# Patient Record
Sex: FEMALE | Race: WHITE | NOT HISPANIC OR LATINO | Employment: FULL TIME | URBAN - METROPOLITAN AREA
[De-identification: names, ages, dates, MRNs, and addresses within clinical notes are randomized per-mention and may not be internally consistent; named-entity substitution may affect disease eponyms.]

---

## 2023-12-11 ENCOUNTER — OFFICE VISIT (OUTPATIENT)
Dept: ORTHOPEDIC SURGERY | Facility: CLINIC | Age: 63
End: 2023-12-11
Payer: COMMERCIAL

## 2023-12-11 VITALS — WEIGHT: 216 LBS | HEIGHT: 70 IN | BODY MASS INDEX: 30.92 KG/M2

## 2023-12-11 DIAGNOSIS — M17.11 ARTHRITIS OF RIGHT KNEE: ICD-10-CM

## 2023-12-11 DIAGNOSIS — G89.29 CHRONIC PAIN OF RIGHT KNEE: ICD-10-CM

## 2023-12-11 DIAGNOSIS — M25.561 CHRONIC PAIN OF RIGHT KNEE: ICD-10-CM

## 2023-12-11 DIAGNOSIS — M16.11 ARTHRITIS OF RIGHT HIP: Primary | ICD-10-CM

## 2023-12-11 PROCEDURE — 1036F TOBACCO NON-USER: CPT | Performed by: ORTHOPAEDIC SURGERY

## 2023-12-11 PROCEDURE — 20610 DRAIN/INJ JOINT/BURSA W/O US: CPT | Performed by: ORTHOPAEDIC SURGERY

## 2023-12-11 PROCEDURE — 99203 OFFICE O/P NEW LOW 30 MIN: CPT | Performed by: ORTHOPAEDIC SURGERY

## 2023-12-11 RX ORDER — LIDOCAINE HYDROCHLORIDE 10 MG/ML
2 INJECTION INFILTRATION; PERINEURAL
Status: COMPLETED | OUTPATIENT
Start: 2023-12-11 | End: 2023-12-11

## 2023-12-11 RX ORDER — TRIAMCINOLONE ACETONIDE 40 MG/ML
40 INJECTION, SUSPENSION INTRA-ARTICULAR; INTRAMUSCULAR
Status: COMPLETED | OUTPATIENT
Start: 2023-12-11 | End: 2023-12-11

## 2023-12-11 RX ADMIN — TRIAMCINOLONE ACETONIDE 40 MG: 40 INJECTION, SUSPENSION INTRA-ARTICULAR; INTRAMUSCULAR at 14:56

## 2023-12-11 RX ADMIN — LIDOCAINE HYDROCHLORIDE 2 ML: 10 INJECTION INFILTRATION; PERINEURAL at 14:56

## 2023-12-11 ASSESSMENT — ENCOUNTER SYMPTOMS: KNEE DEFORMITY: 1

## 2023-12-11 NOTE — PROGRESS NOTES
Subjective    Patient ID: Princess Thompson is a 63 y.o. female.    Chief Complaint: Pain of the Left Knee       This is a 63-year-old female referred by her PCP for evaluation of right leg pain.  The right leg pain is of varying degrees and includes the right hip and buttocks, right thigh as well as the right knee.  With regard to the hip pain she notes difficulty rising out of a chair as well as getting in and out of an automobile.  With regard to the right knee she has noted some crepitus with going up and down steps as well as stiffness after prolonged sitting.  She feels her right hip pain is worse than her right knee pain.  She does have some degree of back pain as well.  She has been told by her PCP Dr. Hernandez that she has advanced arthritis and impingement of her right hip as well as bulging disks at the L3 and 4 level and mild degenerative changes involving the right knee.  Unfortunately she arrives today with no recent x-rays of her back, hip nor knee.  She does arrive though with an MRI scan of her right knee which are reviewed at great length myself demonstrate a small joint effusion with some degenerative changes throughout the knee including mild degenerative chondromalacia of the medial compartment as well as patellofemoral joint space.  She states her hip x-rays were likely done within the last 1 to 2 months but once again these were not available for us today.    This patient's past medical, social, and family history were reviewed as well as a review of systems including updates on the patient's information encounter sheet  Patient denies a history diabetes  Patient remains on the Synthroid medication    Physical Examination  Constitutional: Patient's height and weight reviewed, appears well kempt  Psychiatric: Alert and oriented ×3, appropriate mood and behavior  Pulmonary: Breathing appears nonlabored, no apparent distress  Lymphatic: No appreciable lymphadenopathy to both the upper and lower  extremities  Skin: No open lesions, rashes, ulcerations  Neurologic: Gross motor and sensory exam appear intact (except for abnormalities noted in the below muscle skeletal exam)    Musculoskeletal: There is some mild paraspinal muscle tenderness but satisfactory extension and flexion of the lumbar spine.  The right hip though has limited flexion to 95 degrees.  Attempted internal rotation more than 5 degrees recreate groin and thigh pain.  External rotation is limited to 20 degrees.  The right knee reveals full extension as well as flexion past 125 degrees but there is moderate patellofemoral crepitus with range of motion and a moderately positive patellar apprehension sign    Assessment: Right lower extremity pain likely of multiple etiologies with the most concerning being likely right hip arthritis based on examination but unfortunate no x-rays were available for us.  Patient also likely has some degree of right knee arthritis based on exam and review of her MRI    Plan: With regard to the right knee arthritis we elected to proceed with a right knee intra-articular Kenalog injection today.  Patient will observe to see if this is of benefit.  I have suggested she obtain her hip x-rays and refer her for evaluation to Dr. Hardin with those x-rays available for him to review.  If he deems that her hip arthritis is significant he may consider the options for right hip intra-articular injection of Kenalog for both therapeutic and diagnostic purposes.    Left Knee     L Inj/Asp: R knee on 12/11/2023 2:56 PM  Indications: pain  Details: 22 G needle, anterolateral approach  Medications: 40 mg triamcinolone acetonide 40 mg/mL; 2 mL lidocaine 10 mg/mL (1 %)  Consent was given by the patient. Patient was prepped and draped in the usual sterile fashion.             No current outpatient medications on file.

## 2024-01-03 ENCOUNTER — OFFICE VISIT (OUTPATIENT)
Dept: ORTHOPEDIC SURGERY | Facility: CLINIC | Age: 64
End: 2024-01-03
Payer: COMMERCIAL

## 2024-01-03 VITALS — HEIGHT: 71 IN | BODY MASS INDEX: 29.4 KG/M2 | WEIGHT: 210 LBS

## 2024-01-03 DIAGNOSIS — M25.551 RIGHT HIP PAIN: ICD-10-CM

## 2024-01-03 DIAGNOSIS — M16.11 ARTHRITIS OF RIGHT HIP: Primary | ICD-10-CM

## 2024-01-03 PROCEDURE — 1036F TOBACCO NON-USER: CPT | Performed by: FAMILY MEDICINE

## 2024-01-03 PROCEDURE — 20611 DRAIN/INJ JOINT/BURSA W/US: CPT | Performed by: FAMILY MEDICINE

## 2024-01-03 PROCEDURE — 99213 OFFICE O/P EST LOW 20 MIN: CPT | Performed by: FAMILY MEDICINE

## 2024-01-03 RX ORDER — LIDOCAINE HYDROCHLORIDE 20 MG/ML
2 INJECTION, SOLUTION INFILTRATION; PERINEURAL
Status: COMPLETED | OUTPATIENT
Start: 2024-01-03 | End: 2024-01-03

## 2024-01-03 RX ORDER — TRIAMCINOLONE ACETONIDE 40 MG/ML
40 INJECTION, SUSPENSION INTRA-ARTICULAR; INTRAMUSCULAR
Status: COMPLETED | OUTPATIENT
Start: 2024-01-03 | End: 2024-01-03

## 2024-01-03 RX ADMIN — TRIAMCINOLONE ACETONIDE 40 MG: 40 INJECTION, SUSPENSION INTRA-ARTICULAR; INTRAMUSCULAR at 14:30

## 2024-01-03 RX ADMIN — LIDOCAINE HYDROCHLORIDE 2 ML: 20 INJECTION, SOLUTION INFILTRATION; PERINEURAL at 14:30

## 2024-01-03 NOTE — PROGRESS NOTES
History of Present Illness   Chief Complaint   Patient presents with    Right Hip - Follow-up       The patient is 63 y.o. female  here with a complaint of right hip and thigh pain with radiation past the knee into the lower leg.  Patient has been referred by Dr. LoPresti who saw patient a few weeks ago for this as well as some knee pain.  She was treated with a knee joint injection by Dr. LoPresti which did provide good improvement in her knee pain but still having pain in the anterior hip, thigh and lower leg.  She does bring x-rays of her hip in today for review as well as her lumbar spine.,  She has pain with attempts at going from sitting to standing, getting in and out of her car.  She has been taking meloxicam for her pain with minimal change in symptoms.    No past medical history on file.    Medication Documentation Review Audit       Reviewed by Michael A Lopresti, MD (Physician) on 12/11/23 at 1452      Medication Order Taking? Sig Documenting Provider Last Dose Status            No Medications to Display                                   No Known Allergies    Social History     Socioeconomic History    Marital status:      Spouse name: Not on file    Number of children: Not on file    Years of education: Not on file    Highest education level: Not on file   Occupational History    Not on file   Tobacco Use    Smoking status: Former     Types: Cigarettes    Smokeless tobacco: Never   Substance and Sexual Activity    Alcohol use: Yes     Comment: MODERATELY    Drug use: Never    Sexual activity: Not on file   Other Topics Concern    Not on file   Social History Narrative    Not on file     Social Determinants of Health     Financial Resource Strain: Not on file   Food Insecurity: Not on file   Transportation Needs: Not on file   Physical Activity: Not on file   Stress: Not on file   Social Connections: Not on file   Intimate Partner Violence: Not on file   Housing Stability: Not on file       No  past surgical history on file.       Review of Systems   GENERAL: Negative  GI: Negative  MUSCULOSKELETAL: See HPI  SKIN: Negative  NEURO:  Negative     Physical Exam:    General/Constitutional: well appearing, no distress, appears stated age  HEENT: sclera clear  Respiratory: non labored breathing  Vascular: No edema, swelling or tenderness, except as noted in detailed exam.  Integumentary: No impressive skin lesions present, except as noted in detailed exam.  Neurological:  Alert and oriented   Psychological:  Normal mood and affect.  Musculoskeletal: Normal, except as noted in detailed exam and in HPI.  Antalgic gait, unassisted    Right hip: Normal appearance, no rotational deformity or leg length discrepancy.  No areas of significant tenderness to palpation.  She has some knee range of motion, flexion to 95 degrees, internal rotation 10 degrees with exacerbation of groin and thigh pain.  No significant motor deficits are present.  She does have a positive Stinchfield test.       Imaging: X-rays of right hip and lumbar spine available for review, she has advanced degenerative changes of the right hip, there is joint space narrowing, there is subchondral sclerosis and osteophytosis.  X-rays of lumbar spine show some mild degenerative changes with mild disc height loss, anterior osteophytosis, there is some facet arthropathy    L Inj/Asp: R hip joint on 1/3/2024 2:30 PM  Indications: pain  Details: 22 G needle, ultrasound-guided anterior approach  Medications: 40 mg triamcinolone acetonide 40 mg/mL; 2 mL lidocaine 20 mg/mL (2 %)  Outcome: tolerated well, no immediate complications  Procedure, treatment alternatives, risks and benefits explained, specific risks discussed. Consent was given by the patient. Immediately prior to procedure a time out was called to verify the correct patient, procedure, equipment, support staff and site/side marked as required. Patient was prepped and draped in the usual sterile  fashion.               Assessment   1. Arthritis of right hip        2. Right hip pain  Point of Care Ultrasound        Right hip/lower extremity pain, symptoms thought to be secondary to advanced osteoarthritis of the right hip which was confirmed on x-rays today, we discussed potential for possible radicular component to her symptoms that are extending past her knee    Plan: Discussed diagnosis, further workup and treatment.  We did proceed with ultrasound-guided right hip joint injection with Kenalog today for both diagnostic and hopefully therapeutic purposes.  She will monitor for improvement in symptoms over the next few days, if she has ongoing pain in the lower leg could consider medical spine evaluation for lumbar radiculopathy.  Patient voiced understanding.  She will follow-up as symptoms dictate.

## 2024-04-03 ENCOUNTER — OFFICE VISIT (OUTPATIENT)
Dept: ORTHOPEDIC SURGERY | Facility: CLINIC | Age: 64
End: 2024-04-03
Payer: COMMERCIAL

## 2024-04-03 DIAGNOSIS — M16.11 PRIMARY OSTEOARTHRITIS OF RIGHT HIP: ICD-10-CM

## 2024-04-03 DIAGNOSIS — S76.311A HAMSTRING STRAIN, RIGHT, INITIAL ENCOUNTER: Primary | ICD-10-CM

## 2024-04-03 PROCEDURE — 1036F TOBACCO NON-USER: CPT | Performed by: FAMILY MEDICINE

## 2024-04-03 PROCEDURE — 99213 OFFICE O/P EST LOW 20 MIN: CPT | Performed by: FAMILY MEDICINE

## 2024-04-03 NOTE — PROGRESS NOTES
History of Present Illness   Chief Complaint   Patient presents with    Right Hip - Follow-up       The patient is 63 y.o. female  here for follow-up of right hip pain.  Patient was initially seen by me 3 months ago, at that time she was treated with hip joint injection for underlying advanced osteoarthritis.  She was complaining of some pain past the knee at that time, wondering if there was potential radicular component to her symptoms but says that after her hip joint injection she had resolution of pain and was doing really well, relatively asymptomatic.  Presented last week sometime she started having some new discomfort in the more posterior and lateral aspect of her hip.  She says that if she bends down to tie her shoe or reach really on the ground as she stands up she will get a pain in the posterior lateral aspect of her hip, will resolve after she is up standing and says she does fine after this.  She denies any significant pain with walking or sitting.  She denies any return of any anterior hip or groin pain.        No past medical history on file.    Medication Documentation Review Audit       Reviewed by Roberto Hardin MD (Physician) on 01/03/24 at 1431      Medication Order Taking? Sig Documenting Provider Last Dose Status            No Medications to Display                                   No Known Allergies    Social History     Socioeconomic History    Marital status:      Spouse name: Not on file    Number of children: Not on file    Years of education: Not on file    Highest education level: Not on file   Occupational History    Not on file   Tobacco Use    Smoking status: Former     Types: Cigarettes    Smokeless tobacco: Never   Substance and Sexual Activity    Alcohol use: Yes     Comment: MODERATELY    Drug use: Never    Sexual activity: Not on file   Other Topics Concern    Not on file   Social History Narrative    Not on file     Social Determinants of Health     Financial Resource  Strain: Not on file   Food Insecurity: Not on file   Transportation Needs: Not on file   Physical Activity: Not on file   Stress: Not on file   Social Connections: Not on file   Intimate Partner Violence: Not on file   Housing Stability: Not on file       No past surgical history on file.       Review of Systems   GENERAL: Negative  GI: Negative  MUSCULOSKELETAL: See HPI  SKIN: Negative  NEURO:  Negative     Physical Exam:    General/Constitutional: well appearing, no distress, appears stated age  HEENT: sclera clear  Respiratory: non labored breathing  Vascular: No edema, swelling or tenderness, except as noted in detailed exam.  Integumentary: No impressive skin lesions present, except as noted in detailed exam.  Neurological:  Alert and oriented   Psychological:  Normal mood and affect.  Musculoskeletal: Normal, except as noted in detailed exam and in HPI.  Normal gait, unassisted    Right hip: Normal appearance, no rotational deformity or leg length discrepancy.  There is some tenderness palpation at the greater trochanter on the right however this is equal to tenderness that she has at the left greater trochanter as well.  There is some mild tenderness near the proximal hamstring tendon origin posteriorly.  Limited range of motion at the hip flexion to 95 degrees, internal rotation 10 degrees, no groin pain today with hip range of motion actively or passively.  No significant motor deficits are present.  Negative Stinchfield test.  There is some exacerbation of posterior lateral hip pain with passive hip flexion with knee extended as well as some resisted hip extension.         Assessment   1. Hamstring strain, right, initial encounter        2. Primary osteoarthritis of right hip          Patient had good response to hip joint injection 3 months ago with resolution of hip joint pain, and has developed some new onset posterior hip pain over the past week, symptoms suggestive of proximal hamstring strain, does  feel like it is improving over the past 24 to 48 hours    Plan: Discussed diagnosis, further workup and treatment options with patient.  Recommending conservative management.  I did give her some home exercises, stretches that she can begin for the proximal hamstring.  She was also given a general hip conditioning program for her hip osteoarthritis.  She does have some tenderness at the greater trochanter on the right but is also similar to pain that she has on the left and does not describe any focal pain in this area, we opted to hold off on any lateral hip injection today.  She will follow-up if symptoms are ongoing despite conservative management.

## 2024-08-16 ENCOUNTER — HOSPITAL ENCOUNTER (OUTPATIENT)
Dept: RADIOLOGY | Facility: CLINIC | Age: 64
Discharge: HOME | End: 2024-08-16
Payer: COMMERCIAL

## 2024-08-16 ENCOUNTER — OFFICE VISIT (OUTPATIENT)
Dept: ORTHOPEDIC SURGERY | Facility: CLINIC | Age: 64
End: 2024-08-16
Payer: COMMERCIAL

## 2024-08-16 VITALS — WEIGHT: 210 LBS | BODY MASS INDEX: 29.4 KG/M2 | HEIGHT: 71 IN

## 2024-08-16 DIAGNOSIS — M25.562 ACUTE PAIN OF LEFT KNEE: ICD-10-CM

## 2024-08-16 DIAGNOSIS — M17.12 ARTHRITIS OF LEFT KNEE: Primary | ICD-10-CM

## 2024-08-16 DIAGNOSIS — G89.29 CHRONIC PAIN OF LEFT KNEE: ICD-10-CM

## 2024-08-16 DIAGNOSIS — M25.562 CHRONIC PAIN OF LEFT KNEE: ICD-10-CM

## 2024-08-16 PROCEDURE — 99214 OFFICE O/P EST MOD 30 MIN: CPT

## 2024-08-16 PROCEDURE — 20610 DRAIN/INJ JOINT/BURSA W/O US: CPT | Mod: LT

## 2024-08-16 PROCEDURE — 2500000005 HC RX 250 GENERAL PHARMACY W/O HCPCS

## 2024-08-16 PROCEDURE — 2500000004 HC RX 250 GENERAL PHARMACY W/ HCPCS (ALT 636 FOR OP/ED)

## 2024-08-16 PROCEDURE — 73562 X-RAY EXAM OF KNEE 3: CPT | Mod: LT

## 2024-08-16 RX ORDER — LIDOCAINE HYDROCHLORIDE 20 MG/ML
2 INJECTION, SOLUTION INFILTRATION; PERINEURAL
Status: COMPLETED | OUTPATIENT
Start: 2024-08-16 | End: 2024-08-16

## 2024-08-16 RX ORDER — TRIAMCINOLONE ACETONIDE 40 MG/ML
40 INJECTION, SUSPENSION INTRA-ARTICULAR; INTRAMUSCULAR
Status: COMPLETED | OUTPATIENT
Start: 2024-08-16 | End: 2024-08-16

## 2024-08-16 NOTE — PROGRESS NOTES
Subjective    Patient ID: Princess Thompson is a 64 y.o. female.    Chief Complaint: Pain of the Left Knee     HPI  This is a pleasant 64-year-old female presenting to the office for evaluation of left knee pain.  There is been no known injury.  Pain has been ongoing for approximately 4 days.  Patient did just return from a vacation to [place].  States she was taking walks on the beach and did have a 14-hour drive home.  Patient was very concerned when Sunday morning, she was having significant difficulty weightbearing on left lower extremity.  States that knee was very swollen, she noticed swelling to anterior and posterior aspect of her knee.  Pain is focal to anterior medial knee.  Pain and swelling have caused limited range of motion.  She is having mechanical symptoms of knee giving out and locking.  Pain and swelling hinder activities of daily living including prolonged standing walking and stair climbing.  The pain is awaking her at night.  She definitely has increased pain with any incline walking as well.  She has been taking meloxicam or ibuprofen as well as applying ice and elevating.  She has been using a compression type knee sleeve.  Denies redness or warmth to knee.    The patient's past medical, surgical, family, and social history as well as allergies and medications were reviewed and updated in the chart.    Objective   Ortho Exam  Pleasant and no acute distress. Walks with a mildly antalgic gait.   Left knee appearing without soft tissue swelling erythema or ecchymosis.  There is no warmth upon touch.  Patellofemoral crepitus noted with range of motion testing.  Left knee range of motion is 5-110°.  Cris's is positive with pain medially.  Focal medial joint line tenderness, mild tenderness to popliteal fossa.  The knee is stable to varus and valgus stress Lachman and posterior drawer. There is a mild effusion. Right knee range of motion is 0-120°. There is no effusion. The knee is  stable to varus and valgus stress Lachman and posterior drawer. Both lower extremities are well perfused the skin is intact and muscle tone is adequate.    Image Results:  Multiple view x-rays of the left knee obtained today personally reviewed without evidence of acute fracture or dislocation. There are degenerative changes of the left knee noted in medial and patellofemoral compartments with joint space narrowing and osteophyte formation.     Assessment/Plan   Encounter Diagnoses:  Arthritis of left knee    Chronic pain of left knee    Plan: Discussion with patient about left knee arthritis with review of today's x-rays.  Conservative treatment options were discussed at length.  Explained to patient that most likely she has sustained a left knee effusion, which lead to the back of her knee and caused a Baker's cyst.  There is concern though for possible medial meniscus tear, as she does have focal medial joint line tenderness and is having mechanical symptoms of knee giving out and buckling.  After the risks and benefits of the left knee intra-articular steroid injection of Kenalog/lidocaine were discussed, patient agreed to injection and tolerated well.  She can receive these injections every 3 months as needed.  Discussed the use of Voltaren gel applied to medial knee to help decrease pain and inflammation.  We discussed a home exercise program including walking on a treadmill with no incline, using a recumbent bike, and any type of water therapy.  She knows to avoid any high impact activities.  I have also provided patient an economy hinged brace to help with left knee stability.  She will avoid aggravating activities.  She can take Tylenol and apply ice.  If patient does not see significant relief of symptoms and 4 to 6 weeks, an MRI of the left knee would be indicated to assess for medial meniscus tear.    L Inj/Asp: L knee on 8/16/2024 7:52 PM  Indications: pain  Details: 22 G needle, superolateral  approach  Medications: 40 mg triamcinolone acetonide 40 mg/mL; 2 mL lidocaine 20 mg/mL (2 %)  Procedure, treatment alternatives, risks and benefits explained, specific risks discussed. Consent was given by the patient.            Orders Placed This Encounter    XR knee left 3 views

## 2024-10-02 ENCOUNTER — OFFICE VISIT (OUTPATIENT)
Dept: ORTHOPEDIC SURGERY | Facility: CLINIC | Age: 64
End: 2024-10-02
Payer: COMMERCIAL

## 2024-10-02 ENCOUNTER — HOSPITAL ENCOUNTER (OUTPATIENT)
Dept: RADIOLOGY | Facility: EXTERNAL LOCATION | Age: 64
Discharge: HOME | End: 2024-10-02

## 2024-10-02 DIAGNOSIS — M17.12 ARTHRITIS OF LEFT KNEE: ICD-10-CM

## 2024-10-02 DIAGNOSIS — M25.551 RIGHT HIP PAIN: ICD-10-CM

## 2024-10-02 DIAGNOSIS — M16.11 PRIMARY OSTEOARTHRITIS OF RIGHT HIP: Primary | ICD-10-CM

## 2024-10-02 PROCEDURE — 99214 OFFICE O/P EST MOD 30 MIN: CPT | Performed by: FAMILY MEDICINE

## 2024-10-02 PROCEDURE — 20611 DRAIN/INJ JOINT/BURSA W/US: CPT | Mod: RT | Performed by: FAMILY MEDICINE

## 2024-10-02 PROCEDURE — 1036F TOBACCO NON-USER: CPT | Performed by: FAMILY MEDICINE

## 2024-10-02 PROCEDURE — 2500000004 HC RX 250 GENERAL PHARMACY W/ HCPCS (ALT 636 FOR OP/ED): Performed by: FAMILY MEDICINE

## 2024-10-02 RX ORDER — LIDOCAINE HYDROCHLORIDE 20 MG/ML
2 INJECTION, SOLUTION INFILTRATION; PERINEURAL
Status: COMPLETED | OUTPATIENT
Start: 2024-10-02 | End: 2024-10-02

## 2024-10-02 RX ORDER — TRIAMCINOLONE ACETONIDE 40 MG/ML
40 INJECTION, SUSPENSION INTRA-ARTICULAR; INTRAMUSCULAR
Status: COMPLETED | OUTPATIENT
Start: 2024-10-02 | End: 2024-10-02

## 2024-10-02 NOTE — PROGRESS NOTES
History of Present Illness   Chief Complaint   Patient presents with    Right Hip - Follow-up       The patient is 64 y.o. female  here for follow-up of right hip pain/osteoarthritis.  Patient was seen by me in January 2024 for right hip pain/arthritis, x-ray showed some moderate degenerative changes, she was treated with a right hip joint injection which provided good relief, she has a recurrence of pain over the past few weeks, over the anterior groin exacerbated by walking, standing.  She denies any new injuries or symptoms.  She does admit to some referred pain down the leg to the knee, has had knee injection in the past, unsure if knee pain is referred pain from her hip or coming from her knee.    She does mention some recurrent left knee pain, she did see my colleague Betsy in August, x-ray showed some mild degenerative changes, she was treated with a knee joint ejection which provided good relief, she had recurrence of pain on Monday of this week walking on the stairs, has been resting, slight improvement with this but still ongoing pain    No past medical history on file.    Medication Documentation Review Audit       Reviewed by KARLOS Underwood (Nurse Practitioner) on 08/16/24 at 0810      Medication Order Taking? Sig Documenting Provider Last Dose Status            No Medications to Display                                   No Known Allergies    Social History     Socioeconomic History    Marital status:      Spouse name: Not on file    Number of children: Not on file    Years of education: Not on file    Highest education level: Not on file   Occupational History    Not on file   Tobacco Use    Smoking status: Former     Types: Cigarettes    Smokeless tobacco: Never   Substance and Sexual Activity    Alcohol use: Yes     Comment: MODERATELY    Drug use: Never    Sexual activity: Not on file   Other Topics Concern    Not on file   Social History Narrative    Not on file     Social  Determinants of Health     Financial Resource Strain: Not on file   Food Insecurity: Not on file   Transportation Needs: Not on file   Physical Activity: Not on file   Stress: Not on file   Social Connections: Not on file   Intimate Partner Violence: Not on file   Housing Stability: Not on file       No past surgical history on file.       Review of Systems   GENERAL: Negative  GI: Negative  MUSCULOSKELETAL: See HPI  SKIN: Negative  NEURO:  Negative     Physical Exam:    General/Constitutional: well appearing, no distress, appears stated age  HEENT: sclera clear  Respiratory: non labored breathing  Vascular: No edema, swelling or tenderness, except as noted in detailed exam.  Integumentary: No impressive skin lesions present, except as noted in detailed exam.  Neurological:  Alert and oriented   Psychological:  Normal mood and affect.  Musculoskeletal: Normal, except as noted in detailed exam and in HPI antalgic gait, unassisted    Right hip: Normal appearance, no rotational deformity or ligament discrepancy.  She does have some limited range of motion at the hip, flexion to 90 degrees, internal rotation 5 to 10 degrees with exacerbation of anterior hip and groin pain.  No motor deficits are present, positive Stinchfield test      Left knee: Normal appearance, no skin changes, there is a trace joint effusion.  There is generalized tenderness to palpation.  Range of motion from 3-110 degrees with pain, no motor deficits present, no gross ligamentous laxity, there is some discomfort with Bailey testing.     Imaging: No new imaging today    L Inj/Asp: R hip joint on 10/2/2024 10:47 AM  Indications: pain  Details: 22 G needle, ultrasound-guided anterior approach  Medications: 40 mg triamcinolone acetonide 40 mg/mL; 2 mL lidocaine 20 mg/mL (2 %)  Outcome: tolerated well, no immediate complications  Procedure, treatment alternatives, risks and benefits explained, specific risks discussed. Consent was given by the patient.  Immediately prior to procedure a time out was called to verify the correct patient, procedure, equipment, support staff and site/side marked as required. Patient was prepped and draped in the usual sterile fashion.               Assessment   1. Primary osteoarthritis of right hip        2. Right hip pain  Point of Care Ultrasound      3. Arthritis of left knee              Plan: Discussed diagnosis, further workup and treatment.  We did proceed with ultrasound-guided right hip joint injection under ultrasound guidance with Kenalog, see procedure note for full details.  Hopefully this will improve her hip pain as well as some pain into her knee which is likely referred from the hip, if ongoing knee pain after hip injection could follow back up to discuss right knee pain further.    With regards to left knee pain, recurrent pain over the past 2 days walking on the stairs, did have left knee injection with B[facility] little less than 2 months ago.  Recommended ibuprofen, activity modification, she was given some home exercises to begin.  She will follow-up if symptoms persist despite conservative treatment with the left knee.

## 2024-10-09 ENCOUNTER — HOSPITAL ENCOUNTER (OUTPATIENT)
Dept: RADIOLOGY | Facility: CLINIC | Age: 64
Discharge: HOME | End: 2024-10-09
Payer: COMMERCIAL

## 2024-10-09 ENCOUNTER — HOSPITAL ENCOUNTER (OUTPATIENT)
Dept: RADIOLOGY | Facility: EXTERNAL LOCATION | Age: 64
Discharge: HOME | End: 2024-10-09

## 2024-10-09 ENCOUNTER — OFFICE VISIT (OUTPATIENT)
Dept: ORTHOPEDIC SURGERY | Facility: CLINIC | Age: 64
End: 2024-10-09
Payer: COMMERCIAL

## 2024-10-09 DIAGNOSIS — M25.561 ACUTE PAIN OF RIGHT KNEE: ICD-10-CM

## 2024-10-09 DIAGNOSIS — M17.12 ARTHRITIS OF LEFT KNEE: ICD-10-CM

## 2024-10-09 DIAGNOSIS — M25.562 CHRONIC PAIN OF LEFT KNEE: ICD-10-CM

## 2024-10-09 DIAGNOSIS — M16.11 PRIMARY OSTEOARTHRITIS OF RIGHT HIP: ICD-10-CM

## 2024-10-09 DIAGNOSIS — M17.11 PRIMARY OSTEOARTHRITIS OF RIGHT KNEE: Primary | ICD-10-CM

## 2024-10-09 DIAGNOSIS — G89.29 CHRONIC PAIN OF LEFT KNEE: ICD-10-CM

## 2024-10-09 PROCEDURE — 2500000004 HC RX 250 GENERAL PHARMACY W/ HCPCS (ALT 636 FOR OP/ED): Performed by: FAMILY MEDICINE

## 2024-10-09 PROCEDURE — 73564 X-RAY EXAM KNEE 4 OR MORE: CPT | Mod: RT

## 2024-10-09 PROCEDURE — 73564 X-RAY EXAM KNEE 4 OR MORE: CPT | Mod: RIGHT SIDE | Performed by: RADIOLOGY

## 2024-10-09 PROCEDURE — 20611 DRAIN/INJ JOINT/BURSA W/US: CPT | Mod: RT | Performed by: FAMILY MEDICINE

## 2024-10-09 PROCEDURE — 1036F TOBACCO NON-USER: CPT | Performed by: FAMILY MEDICINE

## 2024-10-09 PROCEDURE — 99214 OFFICE O/P EST MOD 30 MIN: CPT | Mod: 25 | Performed by: FAMILY MEDICINE

## 2024-10-09 PROCEDURE — 99214 OFFICE O/P EST MOD 30 MIN: CPT | Performed by: FAMILY MEDICINE

## 2024-10-09 RX ORDER — LIDOCAINE HYDROCHLORIDE 20 MG/ML
2 INJECTION, SOLUTION INFILTRATION; PERINEURAL
Status: COMPLETED | OUTPATIENT
Start: 2024-10-09 | End: 2024-10-09

## 2024-10-09 RX ORDER — TRIAMCINOLONE ACETONIDE 40 MG/ML
40 INJECTION, SUSPENSION INTRA-ARTICULAR; INTRAMUSCULAR
Status: COMPLETED | OUTPATIENT
Start: 2024-10-09 | End: 2024-10-09

## 2024-10-09 NOTE — PROGRESS NOTES
History of Present Illness   Chief Complaint   Patient presents with    OTHER     F/U LT KNEE       The patient is 64 y.o. female  here for follow-up of right hip, left knee as well as right knee.  Patient was seen by her last week mostly for follow-up of right hip osteoarthritis that responded well to injection in 2024, we did proceed with repeat hip joint injection last week which led to good improvement in her right hip symptoms, at that time she was complaining of some pain into her right knee, there was some concern that this was potentially referred pain from her hip, recommend that she follow back up if she had ongoing knee pain despite hip injection.  At her last visit she also mentions some recurrent left knee pain.  She had seen Betsy in August of this year for left knee pain, swelling, she was treated with a left knee injection which provided good relief with recent recurrence of pain and instability, x-rays of her left knee in August showed patellofemoral predominant degenerative changes moderate in severity, mild medial and lateral pain, there is some concern for potential meniscus pathology given mechanical symptoms.  Recommended follow-up with pain was ongoing.  She says that she continues to have mostly left knee pain and feelings of instability, says she is fearful to bear full weight through her left knee at times.  She feels like her right knee is painful secondary to compensation, pain is more mild on the right but still bothering her specifically as she is relying on her right knee more now with recurrence of her left knee pain and instability.      History reviewed. No pertinent past medical history.    Medication Documentation Review Audit       Reviewed by Roberto Hardin MD (Physician) on 10/09/24 at 1029      Medication Order Taking? Sig Documenting Provider Last Dose Status            No Medications to Display                                   No Known Allergies    Social History      Socioeconomic History    Marital status:      Spouse name: Not on file    Number of children: Not on file    Years of education: Not on file    Highest education level: Not on file   Occupational History    Not on file   Tobacco Use    Smoking status: Former     Types: Cigarettes    Smokeless tobacco: Never   Substance and Sexual Activity    Alcohol use: Yes     Comment: MODERATELY    Drug use: Never    Sexual activity: Not on file   Other Topics Concern    Not on file   Social History Narrative    Not on file     Social Determinants of Health     Financial Resource Strain: Not on file   Food Insecurity: Not on file   Transportation Needs: Not on file   Physical Activity: Not on file   Stress: Not on file   Social Connections: Not on file   Intimate Partner Violence: Not on file   Housing Stability: Not on file       History reviewed. No pertinent surgical history.       Review of Systems   GENERAL: Negative  GI: Negative  MUSCULOSKELETAL: See HPI  SKIN: Negative  NEURO:  Negative     Physical Exam:    General/Constitutional: well appearing, no distress, appears stated age  HEENT: sclera clear  Respiratory: non labored breathing  Vascular: No edema, swelling or tenderness, except as noted in detailed exam.  Integumentary: No impressive skin lesions present, except as noted in detailed exam.  Neurological:  Alert and oriented   Psychological:  Normal mood and affect.  Musculoskeletal: Normal, except as noted in detailed exam and in HPI antalgic gait, unassisted    Left knee: Normal appearance, no skin changes, no joint effusion.  There is generalized tenderness to palpation including distal quad, medial and lateral joint line.  Range of motion from 3-110 degrees with pain, no motor deficits present, no gross ligamentous laxity, positive Cris testing.    Right knee normal appearance, no swelling, no redness or warmth.  There is medial and lateral joint line tenderness, range of motion from 3 to 120  degrees with some discomfort endrange of flexion.  No gross motor deficits present at the knee.  There is some discomfort with Bailey testing.  Negative Lachman, negative posterior drawer.  Stable to varus and valgus stress     Imaging: X-rays of right knee obtained today and independently reviewed, there is some mild degenerative changes presents, medial lateral joint spaces are relatively well-preserved, there is some osteophytosis, there is some narrowing of the patellofemoral compartment, more mild compared to the left    L Inj/Asp: R knee on 10/9/2024 10:27 AM  Indications: pain  Details: 22 G needle, ultrasound-guided superolateral approach  Medications: 40 mg triamcinolone acetonide 40 mg/mL; 2 mL lidocaine 20 mg/mL (2 %)  Outcome: tolerated well, no immediate complications  Procedure, treatment alternatives, risks and benefits explained, specific risks discussed. Consent was given by the patient. Immediately prior to procedure a time out was called to verify the correct patient, procedure, equipment, support staff and site/side marked as required. Patient was prepped and draped in the usual sterile fashion.               Assessment   1. Primary osteoarthritis of right knee  Referral to Physical Therapy      2. Primary osteoarthritis of right hip        3. Acute pain of right knee  XR knee right 4+ views    Point of Care Ultrasound      4. Arthritis of left knee  Referral to Physical Therapy      5. Chronic pain of left knee  MR knee left wo IV contrast            Plan:     Right knee pain: Symptoms thought to be aggravation of mild underlying osteoarthritis, we did proceed with knee joint duction with Kenalog today, hopefully this will provide better relief compared to her left knee injection.  Referral to physical therapy was also placed  Right hip osteoarthritis, good response to recent hip joint injection, she will follow-up as needed for this, discussed potential need for replacement surgery in the  future  Left knee pain: Previous x-ray showed moderate patellofemoral predominant osteoarthritis, she is having recurrence of pain after injection 6 weeks ago, she is having ongoing instability/buckling of the knee, there is some concern for potential meniscus pathology contributing to her mechanical symptoms.  I did place referral to physical therapy to work on some knee strengthening and stability however I would like to obtain an MRI to evaluate for other derangement of the knee that may require surgical intervention/knee arthroscopy given instability of the knee.

## 2024-10-22 ENCOUNTER — EVALUATION (OUTPATIENT)
Dept: PHYSICAL THERAPY | Facility: CLINIC | Age: 64
End: 2024-10-22
Payer: COMMERCIAL

## 2024-10-22 DIAGNOSIS — M17.12 ARTHRITIS OF LEFT KNEE: ICD-10-CM

## 2024-10-22 DIAGNOSIS — M17.11 PRIMARY OSTEOARTHRITIS OF RIGHT KNEE: ICD-10-CM

## 2024-10-22 PROCEDURE — 97161 PT EVAL LOW COMPLEX 20 MIN: CPT | Mod: GP | Performed by: INTERNAL MEDICINE

## 2024-10-22 PROCEDURE — 97110 THERAPEUTIC EXERCISES: CPT | Mod: GP | Performed by: INTERNAL MEDICINE

## 2024-10-22 NOTE — PROGRESS NOTES
PHYSICAL THERAPY EVALUATION AND TREATMENT    Patient Name: Princess Thompson  MRN: [id number]  Today's Date: 10/22/2024  Time Calculation  Start Time: 1705  Stop Time: 1745  Time Calculation (min): 40 min    Insurance:  Visit number: 1 (updated 10/22/24)  Insurance Type: Payor: JUDY / Plan: JUDY [place] / Product Type: *No Product type* /   EVAL $78.30 //90 PT/OT/ST V PCY 0 USED NO AUTH NEEDED PAYS AT 70% DED MET OOP 7500.00 4191.55 APPLIED   Referred by: Roberto Hardin MD     PT Evaluation Time Entry  PT Evaluation (Low) Time Entry: 30  PT Therapeutic Procedures Time Entry  Therapeutic Exercise Time Entry: 10                     Assessment:  PT Assessment Results: Decreased strength, Decreased range of motion, Decreased endurance, Pain  Rehab Prognosis: Good  Evaluation/Treatment Tolerance: Patient tolerated treatment well, Patient limited by pain    Princess Thompson  is a 64 y.o. old patient who participated in a physical therapy evaluation today due to B knee pain and R hip pain. Pt agreeable to focus on R knee pain for today's evaluation. Pt presents with signs and symptoms consistent with R-sided L4 lumbar radiculopathy based on referral pattern and pain location, difficulty with heel walking, and positive SLR test. Her impairments include: postural deficits, tenderness, strength deficits, pain, and trunk ROM deficits. Due to these impairments, she has the following functional limitations and participation restrictions: increased fall risk with R knee buckling, difficulty standing and walking, difficulty with sleeping, lifting, performing household/recreational/occupational activities, and performing some ADLs. Skilled physical therapy services are appropriate and beneficial in order to achieve measurable and meaningful change in the above objective tests and measures. Utilization of skilled physical therapy services will aid in advancing her functional status and attaining her therapy-related goals. The  "patient verbalized understanding and is in agreement with all goals and plan of care.  Plan of care was developed with input and agreement by the patient    Plan:   Treatment/Interventions: Cryotherapy, Dry needling, Education/ Instruction, Electrical stimulation, Manual therapy, Neuromuscular re-education, Taping techniques, Self care/ home management, Therapeutic activities, Therapeutic exercises, Ultrasound  PT Plan: Skilled PT  PT Frequency: 1 time per week  Duration: 6 weeks  Rehab Potential: Good  Plan of Care Agreement: Patient    NV: wesley L knee as able. Monitor response to HEP for suspected R-sided lumbar radicular pain and progress as carmen. Body mechanics education    Therapy Diagnosis:   Problem List Items Addressed This Visit    None  Visit Diagnoses         Codes    Primary osteoarthritis of right knee     M17.11    Relevant Orders    Follow Up In Physical Therapy    Arthritis of left knee     M17.12    Relevant Orders    Follow Up In Physical Therapy            Subjective    Onset: 2 years    JANET: None. Pt reports increased pain on vacation this past mid July; walked on the beach but denies falls, adverse events, or change in activity level. Pt reports R knee buckling sensation occurs more often now; 2x today. Pt endorses 1/10 R knee pain at rest. Pt states she is able to walk 10 mins before onset of pain. Of note, pt received injection in R hip and R knee without improvement in pain.    Pain location:  R lateral and superior knee pain  Pain description:  sharp   8/10 at worst; 1/10 at best    Worse with:  walking, standing  Better with:  icing, ibuprofen     Denies N/T   Denies bowel/bladder incontinence    Prior treatments/interventions:  None    Home: Split level home 0 [address]. 5 steps in to kitchen with HRs and 8 steps to second floor with HR  PLOF/CLOF: Independent with all mobility, ADLs, and iADLs without AD  Functional limitations: standing, walking, lifting, personal care, sleeping,   Pt's goal: \" " "strengthen supporting muscles \"     Work: Part-time working in kitchen at [employer]  Exercise: 3x/week for bike or TM for 40 mins  Hobbies: hiking, walking, traveling    Diagnostic images:   XR knee right 4+ views 10/10/24:  FINDINGS:  There is no evidence of acute fracture or dislocation identified. The  joint spaces are well preserved throughout without significant  degenerative changes. No suprapatellar joint effusion is present.      IMPRESSION:  1. No acute fracture or dislocation.    XR knee left 3 views 08/16/24:  INDINGS:  The structures are diffusely demineralized.      There are findings of tricompartmental osteoarthritis of the left  knee with joint space narrowing greater at the patellofemoral joint  marginal spurring. There is no sign of acute fracture or dislocation.  There is question of chondrocalcinosis in the lateral radiograph  anteriorly. The surrounding soft tissues are otherwise unremarkable.      IMPRESSION:  Tricompartmental arthritic changes of the left knee predominately at  the patellofemoral joint. 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      Medical History Form: Reviewed (scanned into chart)    Precautions:  Fall Risk: None   + HBP; medically managed. + HA/migraines; medically manage. +OA; medically managed. +Thyroid disorder; medically managed  Denies: CA, pacemaker, DM, blood thinner medication use, latex allergy, epilepsy/seizures, or other known cardio/neuro/pulmonary problems   Denies unexpected weight loss/gain, night sweats, or night pain.    Previous surgeries include:  None orthopedically related    Objective   Outcome Measures:  Other Measures  Oswestry Disablity Index (HANG): 42%     Observation: Increased familiar sx's with R hip flexion in sitting and supine  Gait: WFL  Posture: fair, forward head, rounded shoulders  Correction of posture: ne on sx's     Dermatomes/Sensation Testing:  (L2) Anterior Thigh:  intact  (L3) Medial Knee:  intact  (L4) Medial Malleolus:  intact  (L5) " Dorsal Second Toe Web Space: intact  (S1) Lateral Malleolus:  intact    Myotomes/Strength Testing (MMT in sitting):  (L2) Hip Flex (R/L):  4/5 p!, 4+ /5  (L3) Knee Ext (R/L): 5/5, 5/5  Knee Flex (R/L): 5/5, 5/5  (L4) Ankle DF (R/L): 4+/5, 4+/5  (L5) GTE (R/L): 4+/5, 4+/5  (S1) Ankle PF (R/L):  5/5, 5/5    Hip Abd. (R/L): 4+/5 , 4+/5  Hip Ext. (R/L): 4-/5p! , 4/5 p! (On R)    Heel walking (L4): diminished R. Intact L  Toe walking (S1):  WFL B    Special Tests:  Slump R/L:  neg / neg  Active SLR R/L: pos/ neg    Lumbar ROM/Pretest Symptoms Standing:  FIS: no limitation with reach to floor; ne on sx's  RFIS: 10x; sx's worse  EIS: mod limitation; ne on sx's  LAMONT: 20x; sx's better    Trunk SB R/L: mod limitation; sx's worse/ min limitation; sx's worse  Trunk Rotation R/L: no limitation; ne on sx's / no limitation; ne on sx's     Palpation: TTP to L4-L5, R PSIS, R superior glute, R greater trochanter, R lateral knee      KEY  NT = not tested this visit  p! = pain  ~ = approximation      Treatments:  Therapeutic Exercise   Access Code: [email or web address]  - Standing Lumbar Extension  - 1 x daily - 7 x weekly - 3 sets - 10 reps  - Supine Bridge  - 1 x daily - 7 x weekly - 3 sets - 10 reps  - Supine Transversus Abdominis Bracing - Hands on Stomach  - 1 x daily - 7 x weekly - 2 sets - 10 reps - 5 second hold      EDUCATION:  Home exercise program instructed and issued. Answered questions about home exercise program. Provided manual cues for correct performance of exercises. Provided verbal feedback to improve exercise technique. Objective exam findings. POC. Lumbar spine anatomy and movement/positioning effect of radicular sx's. Briefly discussed proper body mechanics at work, avoiding sustained fwd flexion and alternating propping foot on stool alignment of back and comfort.     Goals:  ST weeks  1. Patient independent with home exercise program to progress current functional status    LTGs: 6 weeks  1.  Improve Oswestry score to  0-19% impairment to indicate a significant improvement in overall lumbar perception of disability.  2.  Decrease complaints of pain by 80% at minimum of evaluation rating, 4 of 7 days a week at minimum.  3. Patient will demonstrate improvements in sitting and standing posture without cueing from therapist during 45 minute session to allow for improved tolerances for spinal loading.  4.  Patient will demonstrate appropriate and safe body mechanics with work related and/or clinical activities to manage pain and prevent further injury  5.  Increase LE strength by 0.5 to 1 manual testing grade to improve overall functional mobility for IADLS and postural control.    6.  Increase spine AROM to </= mild limitations as appropriate in each plane, to improve functional mobility tolerances for IADLS.

## 2024-10-24 ENCOUNTER — TREATMENT (OUTPATIENT)
Dept: PHYSICAL THERAPY | Facility: CLINIC | Age: 64
End: 2024-10-24
Payer: COMMERCIAL

## 2024-10-24 DIAGNOSIS — M17.12 ARTHRITIS OF LEFT KNEE: ICD-10-CM

## 2024-10-24 DIAGNOSIS — M17.11 PRIMARY OSTEOARTHRITIS OF RIGHT KNEE: ICD-10-CM

## 2024-10-24 PROCEDURE — 97110 THERAPEUTIC EXERCISES: CPT | Mod: GP | Performed by: INTERNAL MEDICINE

## 2024-10-24 PROCEDURE — 97140 MANUAL THERAPY 1/> REGIONS: CPT | Mod: GP | Performed by: INTERNAL MEDICINE

## 2024-10-24 NOTE — PROGRESS NOTES
Physical Therapy    Physical Therapy Treatment    Patient Name: Princess Thompson  MRN: [id number]  Today's Date: 10/24/2024  Time Calculation  Start Time: 1404  Stop Time: 1445  Time Calculation (min): 41 min    Insurance - reviewed   Visit number: 2 (updated 10/24/24)   Payor: JUDY / Plan: [employer] [place] / Product Type: *No Product type* /    EVAL $78.30 //90 PT/OT/ST V PCY 0 USED NO AUTH NEEDED PAYS AT 70% DED MET OOP 7500.00 4191.55 APPLIED   Referring Provider: Roberto Hardin MD     Assessment:  Progress towards functional goals: Patient reports there has not been a significant change in functional abilities.  Response to interventions:  Per pt subjective reports, pt recreates familiar pain with loaded activities of standing, walking, and stair negotiation which is more suspect of knee pathology vs LBP as well as special tests for meniscal dysfunction in R knee; initiate knee stability program and monitor sx's. Pt does have tenderness in lumbar spine but could be incidental finding; R hip pain referred pain or ITB?? Instructed pt to utilize R knee brace if feelings of instability persist. Pt does not vocalize increased pain at end of session  Justification for continued skilled care: To address remaining functional, objective and subjective deficits to allow the patient to return to full independence with ADLs. Progressive strengthening to stabilize the R knee and hip joint to improve function. Assess effectiveness of HEP. Modify and progress home exercise program. Reduce pain to improve function.      Plan:  Exercises targeting surrounding musculature to R knee and hip and improve function. Continued education on techniques such as ice, compression and elevation to manage pain and swelling. Reduce risk of falling. Improve balance. Modalities as needed to address symptoms. Initiate R and L knee stability program.    Therapy diagnoses/Current problem  Problem List Items Addressed This Visit    None  Visit  "Diagnoses         Codes    Primary osteoarthritis of right knee     M17.11    Arthritis of left knee     M17.12            Subjective:  Princess reports she feels like her condition is neither improving nor worsening.  Progress towards functional goal:  Patient reports there has not been a significant change in functional abilities. Pt reports feeling good this past Tuesday night after eval but increased soreness yesterday (woke up with pain). Cont to have increased pain with standing, standing, walking, and going up the steps. Tylenol and ibuprfen do not seem to improve sx's/pain.  Pain Location R lateral knee  Pain (0-10): 3   HEP adherence / understanding: compliance with the instructed home exercises.    Precautions:  Fall Risk: None   + HBP; medically managed. + HA/migraines; medically manage. +OA; medically managed. +Thyroid disorder; medically managed  Denies: CA, pacemaker, DM, blood thinner medication use, latex allergy, epilepsy/seizures, or other known cardio/neuro/pulmonary problems   Denies unexpected weight loss/gain, night sweats, or night pain.     Previous surgeries include:  None orthopedically related    Objective:  R knee:  - Thessaly test 0 deg: pos  - Thessaly test 30 deg flexion: pos  - Ege's hip ER: pos with audible pop noted  - Ege'e hip IR: neg    Treatment Performed: (\"NP\" = Not Performed)     Therapeutic Exercise:  Access Code: [email or web address]  - Nu-step lvl 4 x5 mins; LE warm-up and subjective  - Standing Lumbar Extension over mat table 20x; ne on sx's   - Prone lying x2 mins; sx's better  - Prone on elbows x2 mins; sx's better  - PPU on elbows 2x10; sx's better. Pt reports pain in hip and lateral knee with transitioning from prone to supine   - Supine Transversus Abdominis Bracing 10x5\" holds  - Supine TA with alternating marches 10x; pt reports increased pain  - Supine TA alternating knee extension 10x    The following were NC this session:   - Supine Bridge  - 1 x daily - 7 x weekly - 3 sets - " 10 reps    Manual Therapy:  Pt prone:  - STM/DTM to L3-L5 lumbar paraspinals and QLs  - Grade 2-3 PA mobs to L1-5 spinous processes       KEY  NC = not completed this visit   ! = pain  ~ = approximation  // = parallel  RA = re-assessment  NV = next visit    Education: Reviewed home exercise program. Answered questions about home exercise program. Provided manual cues for correct performance of exercises. Provided verbal feedback to improve exercise technique. Objective exam findings with knee special tests.      Time Calculation  Start Time: 1404  Stop Time: 1445  Time Calculation (min): 41 min     PT Therapeutic Procedures Time Entry  Manual Therapy Time Entry: 8  Therapeutic Exercise Time Entry: 33

## 2024-10-28 ENCOUNTER — HOSPITAL ENCOUNTER (OUTPATIENT)
Dept: RADIOLOGY | Facility: HOSPITAL | Age: 64
Discharge: HOME | End: 2024-10-28
Payer: COMMERCIAL

## 2024-10-28 DIAGNOSIS — G89.29 CHRONIC PAIN OF LEFT KNEE: ICD-10-CM

## 2024-10-28 DIAGNOSIS — M25.562 CHRONIC PAIN OF LEFT KNEE: ICD-10-CM

## 2024-10-28 PROCEDURE — 73721 MRI JNT OF LWR EXTRE W/O DYE: CPT | Mod: LT

## 2024-10-28 PROCEDURE — 73721 MRI JNT OF LWR EXTRE W/O DYE: CPT | Mod: LEFT SIDE | Performed by: RADIOLOGY

## 2024-10-29 ENCOUNTER — APPOINTMENT (OUTPATIENT)
Dept: PHYSICAL THERAPY | Facility: CLINIC | Age: 64
End: 2024-10-29
Payer: COMMERCIAL

## 2024-11-01 ENCOUNTER — TREATMENT (OUTPATIENT)
Dept: PHYSICAL THERAPY | Facility: CLINIC | Age: 64
End: 2024-11-01
Payer: COMMERCIAL

## 2024-11-01 DIAGNOSIS — M17.12 ARTHRITIS OF LEFT KNEE: ICD-10-CM

## 2024-11-01 DIAGNOSIS — M17.11 PRIMARY OSTEOARTHRITIS OF RIGHT KNEE: ICD-10-CM

## 2024-11-01 PROCEDURE — 97035 APP MDLTY 1+ULTRASOUND EA 15: CPT | Mod: GP,CQ

## 2024-11-01 PROCEDURE — 97110 THERAPEUTIC EXERCISES: CPT | Mod: GP,CQ

## 2024-11-07 ENCOUNTER — APPOINTMENT (OUTPATIENT)
Dept: ORTHOPEDIC SURGERY | Facility: CLINIC | Age: 64
End: 2024-11-07
Payer: COMMERCIAL

## 2024-11-07 ENCOUNTER — TREATMENT (OUTPATIENT)
Dept: PHYSICAL THERAPY | Facility: CLINIC | Age: 64
End: 2024-11-07
Payer: COMMERCIAL

## 2024-11-07 DIAGNOSIS — M17.11 PRIMARY OSTEOARTHRITIS OF RIGHT KNEE: ICD-10-CM

## 2024-11-07 DIAGNOSIS — M17.12 ARTHRITIS OF LEFT KNEE: ICD-10-CM

## 2024-11-07 DIAGNOSIS — M25.562 LEFT KNEE PAIN, UNSPECIFIED CHRONICITY: ICD-10-CM

## 2024-11-07 PROCEDURE — 97110 THERAPEUTIC EXERCISES: CPT | Mod: GP,CQ

## 2024-11-07 PROCEDURE — 97140 MANUAL THERAPY 1/> REGIONS: CPT | Mod: GP,CQ

## 2024-11-07 NOTE — PROGRESS NOTES
Physical Therapy    Physical Therapy Treatment    Patient Name: Princess Thompson  MRN: [id number]  Today's Date: 11/7/2024  Time Calculation  Start Time: 0235  Stop Time: 0315  Time Calculation (min): 40 min    Insurance - reviewed   Visit number: 4 (updated 11/07/24)   Payor: JUDY / Plan: [employer] [place] / Product Type: *No Product type* /    EVAL $78.30 //90 PT/OT/ST V PCY 0 USED NO AUTH NEEDED PAYS AT 70% DED MET OOP 7500.00 4191.55 APPLIED   Referring Provider: Roberto Hardin MD     Assessment:  Progress towards functional goals: Patient reports there has not been a significant change in functional abilities.  Response to interventions: Pt able to complete step exercises w/o increased complaints of knee pain, but c/o R lateral thigh pain following step exercises and L side-lying hip abduction. Able to abolish lateral thigh pain w/ standing back bends against mat table. Kinesiology tape applied to R knee medial and latera joint line w/ and recommended removing tape if irritation occurs. Pt denies pain following treatment session.  Justification for continued skilled care: To address remaining functional, objective and subjective deficits to allow the patient to return to full independence with ADLs. Progressive strengthening to stabilize the R knee and hip joint to improve function. Assess effectiveness of HEP. Modify and progress home exercise program. Reduce pain to improve function.      Plan:  Exercises targeting surrounding musculature to R knee and hip and improve function. Continued education on techniques such as ice, compression and elevation to manage pain and swelling. Reduce risk of falling. Improve balance. Modalities as needed to address symptoms. Initiate R and L knee stability program.    Therapy diagnoses/Current problem  Problem List Items Addressed This Visit    None  Visit Diagnoses         Codes    Primary osteoarthritis of right knee     M17.11    Arthritis of left knee     M17.12       "      Subjective:  Pt denies knee pain on this date. Pt states that her knee buckled yesterday, but not today. Pt reports good tolerance of kinesiology test tape.   Pain Location R lateral knee  Pain (0-10): 3   HEP adherence / understanding: compliance with the instructed home exercises.    Precautions:  Fall Risk: None   + HBP; medically managed. + HA/migraines; medically manage. +OA; medically managed. +Thyroid disorder; medically managed  Denies: CA, pacemaker, DM, blood thinner medication use, latex allergy, epilepsy/seizures, or other known cardio/neuro/pulmonary problems   Denies unexpected weight loss/gain, night sweats, or night pain.     Previous surgeries include:  None orthopedically related    Treatment Performed: (\"NP\" = Not Performed)     Therapeutic Exercise: 32 minutes  Access Code: [email or web address]  - Nu-step lvl 4 x5 mins; LE warm-up and subjective  - slant board stretch 2 x 30 sec hold  - Standing Lumbar Extension over mat table 20x; ne on sx's   - 4\" forward step-ups 2 x 10 ea   - 4\" lateal step-ups 10x ea  - 2\" lateral step dip 2 x 10  - Clamshells 2 x 10 2# above knee   - s/l hip abduction 2 x 10, 0#    NP:  - seated abdominal bracing w/ chest press 2 x 10, 5# KB   - 4\" step downs 10x ea   - EOB modified hip extension: 2 x 10  - sls hip abduction 2 x 10  - Prone lying x2 mins; sx's better  - Prone on elbows x2 mins; sx's better  - PPU on elbows 2x10; sx's better. Pt reports pain in hip and lateral knee with transitioning from prone to supine   - Supine Transversus Abdominis Bracing 10x5\" holds  - Supine TA with alternating marches 10x; pt reports increased pain  - Supine TA alternating knee extension 10x    The following were NC this session:   - Supine Bridge  - 1 x daily - 7 x weekly - 3 sets - 10 reps    Manual Therapy: 8 minutes  Pt short sitting:  Kinesiology tape R knee: medial and lateral joint line support (x-pattern), tension strip across patellar tendon    NP:  - STM/DTM to L3-L5 lumbar " paraspinals and QLs  - Grade 2-3 PA mobs to L1-5 spinous processes     Modalities: NP  US x 10 minutes, 3.3hz @ 50%, int= 1.2, lateral joint line R knee, brown wedge under LE's  Skin intact following      KEY  NC = not completed this visit   ! = pain  ~ = approximation  // = parallel  RA = re-assessment  NV = next visit    Education: Reviewed home exercise program. Answered questions about home exercise program. Provided manual cues for correct performance of exercises. Provided verbal feedback to improve exercise technique. Objective exam findings with knee special tests.      Time Calculation  Start Time: 0235  Stop Time: 0315  Time Calculation (min): 40 min     PT Therapeutic Procedures Time Entry  Manual Therapy Time Entry: 8  Therapeutic Exercise Time Entry: 32

## 2024-11-12 ENCOUNTER — HOSPITAL ENCOUNTER (OUTPATIENT)
Dept: RADIOLOGY | Facility: CLINIC | Age: 64
Discharge: HOME | End: 2024-11-12
Payer: COMMERCIAL

## 2024-11-12 ENCOUNTER — APPOINTMENT (OUTPATIENT)
Dept: ORTHOPEDIC SURGERY | Facility: CLINIC | Age: 64
End: 2024-11-12
Payer: COMMERCIAL

## 2024-11-12 DIAGNOSIS — M25.551 RIGHT HIP PAIN: ICD-10-CM

## 2024-11-12 DIAGNOSIS — M17.0 ARTHRITIS OF BOTH KNEES: ICD-10-CM

## 2024-11-12 DIAGNOSIS — M16.11 ARTHRITIS OF RIGHT HIP: Primary | ICD-10-CM

## 2024-11-12 PROCEDURE — 73502 X-RAY EXAM HIP UNI 2-3 VIEWS: CPT | Mod: RIGHT SIDE | Performed by: RADIOLOGY

## 2024-11-12 PROCEDURE — 73502 X-RAY EXAM HIP UNI 2-3 VIEWS: CPT | Mod: RT

## 2024-11-12 PROCEDURE — 1036F TOBACCO NON-USER: CPT | Performed by: ORTHOPAEDIC SURGERY

## 2024-11-12 PROCEDURE — 99214 OFFICE O/P EST MOD 30 MIN: CPT | Performed by: ORTHOPAEDIC SURGERY

## 2024-11-12 NOTE — PROGRESS NOTES
64-year-old is seen with right hip pain and bilateral knee pain.  She has been having persistent moderate throbbing pain in the right hip and groin that radiates down her leg.  Been having pain on the medial and lateral sides of the right knee and also intermittent moderate throbbing discomfort in the left knee.  She had intra-articular hip injection earlier this year that provided her with relief.  Right knee cortisone injection last month did not help her.    Pleasant in no acute distress.  Walks an antalgic gait.  Right hip flexes 80 degrees with limited internal and external rotation.  There is pain and crepitus with range of motion.  Right knee range of motion is 3 to 120 degrees.  There is a mild effusion and no instability.  There is medial and lateral sided tenderness.  Left hip flexes 90 degrees with adequate internal and external rotation.  Left knee range of motion is 3-120.  There is patellofemoral crepitus.  Generalized tenderness.  No effusion or instability.  No instability involving the right knee.  Both lower extremities are well-perfused the skin is intact and muscle tone is adequate.    Multiple x-ray views of the right knee and multiple x-ray views of the left knee are personally reviewed and there are early to moderate degenerative changes in both knees left more so than right.    MRI of the left knee is personally reviewed there is advanced patellofemoral degenerative changes.  There is degenerative signal within the medial meniscus.    MRI of the right knee is personally reviewed and there are chondral changes but no discrete meniscus tear.    AP pelvis and AP/lateral x-rays of the right hip are personally reviewed and there is advanced degenerative changes involving the right hip with joint space narrowing and osteophyte formation subchondral sclerosis and cystic change.  There is early degenerative changes involving the left hip.    A detailed discussion about her pain was done.  Most of the  pain appears related to her right hip.  At some point she will be a candidate for hip replacement and this was discussed but at this point she would like to attempt another ultrasound-guided intra-articular hip injection.  She is not a candidate for knee arthroscopy for either knee and this was discussed.  Her response to the hip injection will also be helpful determine how much of her knee pain is coming from the hip.  She will avoid aggravating activities and continue with an exercise program in the meantime.   - - -

## 2024-11-13 ENCOUNTER — OFFICE VISIT (OUTPATIENT)
Dept: ORTHOPEDIC SURGERY | Facility: CLINIC | Age: 64
End: 2024-11-13
Payer: COMMERCIAL

## 2024-11-13 ENCOUNTER — HOSPITAL ENCOUNTER (OUTPATIENT)
Dept: RADIOLOGY | Facility: EXTERNAL LOCATION | Age: 64
Discharge: HOME | End: 2024-11-13

## 2024-11-13 DIAGNOSIS — M17.12 ARTHRITIS OF LEFT KNEE: ICD-10-CM

## 2024-11-13 DIAGNOSIS — M17.11 PRIMARY OSTEOARTHRITIS OF RIGHT KNEE: Primary | ICD-10-CM

## 2024-11-13 DIAGNOSIS — M25.551 RIGHT HIP PAIN: ICD-10-CM

## 2024-11-13 DIAGNOSIS — M16.11 ARTHRITIS OF RIGHT HIP: ICD-10-CM

## 2024-11-13 PROCEDURE — 99213 OFFICE O/P EST LOW 20 MIN: CPT | Performed by: FAMILY MEDICINE

## 2024-11-13 PROCEDURE — 1036F TOBACCO NON-USER: CPT | Performed by: FAMILY MEDICINE

## 2024-11-13 NOTE — PROGRESS NOTES
History of Present Illness   Chief Complaint   Patient presents with    Right Hip - Follow-up       The patient is 64 y.o. female  here with a complaint of right hip and right knee pain.  There was some confusion as Dr. James referred her to me today for hip joint injection however she did have hip joint injection with me a little over 1 month ago.  In recap patient has been dealing with some right hip pain as well as bilateral knee pain, x-rays have shown advanced right hip osteoarthritis, she has advanced patellofemoral degenerative changes of the left knee, more mild to moderate patellofemoral degenerative changes of the right knee.  She did have good response to right hip joint injection in January, we proceeded with repeat hip joint injection in early October, she did follow-up 1 week later and said that her right hip was doing better however she was having ongoing pain in the right knee. we initially thought there may be some referred pain to her knee from her hip but given ongoing pain in the knee despite hip injection we did proceed with a right knee joint injection.  She says this was of minimal benefit.  At that same visit she was complaining of some left knee pain with recurrent instability/buckling episodes, we did place order for MRI of her left knee because of the instability episodes which she had done, this did show advanced patellofemoral degenerative changes, there was also a medial meniscus tear.  Patient says that her left knee is now doing okay and she has not had any recurrent instability and very minimal pain.  Today her main symptoms are right hip pain in the anterior hip and groin as well as right knee pain more prominent in the lateral aspect of the knee and deep within the knee.    No past medical history on file.    Medication Documentation Review Audit       Reviewed by Eliezer James MD (Physician) on 11/12/24 at 2708      Medication Order Taking? Sig Documenting Provider Last Dose  Status            No Medications to Display                                   No Known Allergies    Social History     Socioeconomic History    Marital status:      Spouse name: Not on file    Number of children: Not on file    Years of education: Not on file    Highest education level: Not on file   Occupational History    Not on file   Tobacco Use    Smoking status: Former     Types: Cigarettes    Smokeless tobacco: Never   Substance and Sexual Activity    Alcohol use: Yes     Comment: MODERATELY    Drug use: Never    Sexual activity: Not on file   Other Topics Concern    Not on file   Social History Narrative    Not on file     Social Drivers of Health     Financial Resource Strain: Not on file   Food Insecurity: Not on file   Transportation Needs: Not on file   Physical Activity: Not on file   Stress: Not on file   Social Connections: Not on file   Intimate Partner Violence: Not on file   Housing Stability: Not on file       No past surgical history on file.       Review of Systems   GENERAL: Negative  GI: Negative  MUSCULOSKELETAL: See HPI  SKIN: Negative  NEURO:  Negative     Physical Exam:    General/Constitutional: well appearing, no distress, appears stated age  HEENT: sclera clear  Respiratory: non labored breathing  Vascular: No edema, swelling or tenderness, except as noted in detailed exam.  Integumentary: No impressive skin lesions present, except as noted in detailed exam.  Neurological:  Alert and oriented   Psychological:  Normal mood and affect.  Musculoskeletal: Normal, except as noted in detailed exam and in HPI    Right hip: No tenderness palpation, limited range of motion, flexion to 90 degrees with some obligate external rotation, internal rotation to neutral position with exacerbation of anterior pain groin pain    Right knee: There is some lateral joint tenderness to palpation, mild medial joint line tenderness to palpation.  She has range of motion from 3 to 120 degrees, no gross  motor deficit or ligamentous laxity       Imaging: No new imaging today      Assessment   1. Primary osteoarthritis of right knee        2. Arthritis of left knee        3. Arthritis of right hip        4. Right hip pain  Point of Care Ultrasound            Plan:    Right hip osteoarthritis, diminishing response to hip joint injection, last injection was 5 weeks ago, sounds like she got around 2 weeks of relief with this.  We discussed that definitively she would likely require replacement surgery at some point, she will contemplate this  Right knee pain/osteoarthritis: Hip joint injection did not provide any relief of knee pain.  No significant improvement following recent knee joint injection with Kenalog, we will submit request for gel injection to her insurance for right knee  Left knee pain/osteoarthritis: At this point he is doing well, no recurrent instability episodes.  She will follow-up as needed for this, discussed potential need for more definitive management with knee replacement surgery in the future.

## 2024-11-14 ENCOUNTER — TREATMENT (OUTPATIENT)
Dept: PHYSICAL THERAPY | Facility: CLINIC | Age: 64
End: 2024-11-14
Payer: COMMERCIAL

## 2024-11-14 DIAGNOSIS — M17.11 PRIMARY OSTEOARTHRITIS OF RIGHT KNEE: ICD-10-CM

## 2024-11-14 DIAGNOSIS — M17.12 ARTHRITIS OF LEFT KNEE: ICD-10-CM

## 2024-11-14 PROCEDURE — 97035 APP MDLTY 1+ULTRASOUND EA 15: CPT | Mod: GP,CQ

## 2024-11-14 PROCEDURE — 97110 THERAPEUTIC EXERCISES: CPT | Mod: GP,CQ

## 2024-11-14 PROCEDURE — 97140 MANUAL THERAPY 1/> REGIONS: CPT | Mod: GP,CQ

## 2024-11-14 NOTE — PROGRESS NOTES
Physical Therapy    Physical Therapy Treatment    Patient Name: Princess Thompson  MRN: [id number]  Today's Date: 11/14/2024  Time Calculation  Start Time: 0230  Stop Time: 0315  Time Calculation (min): 45 min    Insurance - reviewed   Visit number: 5 (updated 11/14/24)   Payor: JUDY / Plan: [employer] [place] / Product Type: *No Product type* /    EVAL $78.30 //90 PT/OT/ST V PCY 0 USED NO AUTH NEEDED PAYS AT 70% DED MET OOP 7500.00 4191.55 APPLIED   Referring Provider: Roberto Hardin MD     Assessment:  Progress towards functional goals: Patient reports there has not been a significant change in functional abilities.  Response to interventions: Pt c/o increased R knee pain following forward step-ups. Completed supine and s/l w/ improved tolerance compared to weight bearing exercises. Pt displays tightness and trigger points in R lateral quad and R glute medius. Hip and knee pain reduced following US and stm. Pt continues to respond well to kinesio tape w/ reports of pain reduction in weight bearing and walking.   Justification for continued skilled care: To address remaining functional, objective and subjective deficits to allow the patient to return to full independence with ADLs. Progressive strengthening to stabilize the R knee and hip joint to improve function. Assess effectiveness of HEP. Modify and progress home exercise program. Reduce pain to improve function.      Plan:  Exercises targeting surrounding musculature to R knee and hip and improve function. Continued education on techniques such as ice, compression and elevation to manage pain and swelling. Reduce risk of falling. Improve balance. Modalities as needed to address symptoms. Initiate R and L knee stability program.    Therapy diagnoses/Current problem  Problem List Items Addressed This Visit    None  Visit Diagnoses         Codes    Primary osteoarthritis of right knee     M17.11    Arthritis of left knee     M17.12              Subjective:  Pt  "states that she worked today and was on her feet a lot. Pt states that her hip and knee hurt when at work today. Pt states that she took ibuprofen and used a heating pad today.   Pain Location R lateral knee  Pain (0-10): 3   HEP adherence / understanding: compliance with the instructed home exercises.    Precautions:  Fall Risk: None   + HBP; medically managed. + HA/migraines; medically manage. +OA; medically managed. +Thyroid disorder; medically managed  Denies: CA, pacemaker, DM, blood thinner medication use, latex allergy, epilepsy/seizures, or other known cardio/neuro/pulmonary problems   Denies unexpected weight loss/gain, night sweats, or night pain.     Previous surgeries include:  None orthopedically related    Treatment Performed: (\"NP\" = Not Performed)     Therapeutic Exercise:  minutes  Access Code: [email or web address]  - Nu-step lvl 4 x5 mins; LE warm-up and subjective  - slant board stretch 2 x 30 sec hold  - seated hamstring stretch 2 x 30 sec hold   - Standing Lumbar Extension over mat table 10x   - 4\" forward step-ups 2 x 10 ea   - SLR 2 x 10   - s/l hip abduction 2 x 10, 0#    NP:  - 4\" lateal step-ups 10x ea  - 2\" lateral step dip 2 x 10  - Clamshells 2 x 10 2# above knee   - seated abdominal bracing w/ chest press 2 x 10, 5# KB   - 4\" step downs 10x ea   - EOB modified hip extension: 2 x 10  - sls hip abduction 2 x 10  - Prone lying x2 mins; sx's better  - Prone on elbows x2 mins; sx's better  - PPU on elbows 2x10; sx's better. Pt reports pain in hip and lateral knee with transitioning from prone to supine   - Supine Transversus Abdominis Bracing 10x5\" holds  - Supine TA with alternating marches 10x; pt reports increased pain  - Supine TA alternating knee extension 10x    The following were NC this session:   - Supine Bridge  - 1 x daily - 7 x weekly - 3 sets - 10 reps    Manual Therapy: 15 minutes  Side-lying:  Stm of R lateral quad, IT band, glute medius  Tpr of lateral quad  Seated off edge of bed w/ " knee slightly bent:  Kinesiology tape R knee: medial and lateral joint line support (x-pattern), tension strip across patellar tendon    NP:  - STM/DTM to L3-L5 lumbar paraspinals and QLs  - Grade 2-3 PA mobs to L1-5 spinous processes     Modalities: 10 minutes  US x 10 minutes, 3.3hz @ 50%, int= 1.2, lateral joint line R knee, brown wedge under LE's  Skin intact following      KEY  NC = not completed this visit   ! = pain  ~ = approximation  // = parallel  RA = re-assessment  NV = next visit    Education: Reviewed home exercise program. Answered questions about home exercise program. Provided manual cues for correct performance of exercises. Provided verbal feedback to improve exercise technique. Objective exam findings with knee special tests.      Time Calculation  Start Time: 0230  Stop Time: 0315  Time Calculation (min): 45 min     PT Therapeutic Procedures Time Entry  Manual Therapy Time Entry: 15  Therapeutic Exercise Time Entry: 20  PT Modalities Time Entry  Ultrasound Time Entry: 10

## 2024-11-21 ENCOUNTER — TREATMENT (OUTPATIENT)
Dept: PHYSICAL THERAPY | Facility: CLINIC | Age: 64
End: 2024-11-21
Payer: COMMERCIAL

## 2024-11-21 DIAGNOSIS — M25.551 RIGHT HIP PAIN: Primary | ICD-10-CM

## 2024-11-21 DIAGNOSIS — M17.11 PRIMARY OSTEOARTHRITIS OF RIGHT KNEE: ICD-10-CM

## 2024-11-21 DIAGNOSIS — M17.12 ARTHRITIS OF LEFT KNEE: ICD-10-CM

## 2024-11-21 PROCEDURE — 97110 THERAPEUTIC EXERCISES: CPT | Mod: GP | Performed by: INTERNAL MEDICINE

## 2024-11-21 PROCEDURE — 97530 THERAPEUTIC ACTIVITIES: CPT | Mod: GP | Performed by: INTERNAL MEDICINE

## 2024-11-21 NOTE — PROGRESS NOTES
Physical Therapy    Physical Therapy Treatment + Re-assessment Note    Patient Name: Princess Thompson  MRN: [id number]  Today's Date: 11/21/2024  Time Calculation  Start Time: 1449  Stop Time: 1530  Time Calculation (min): 41 min    Insurance - reviewed   Visit number: 6 (updated 11/21/24)   Payor: JUDY / Plan: [facility] [place] / Product Type: *No Product type* /    EVAL $78.30 //90 PT/OT/ST V PCY 0 USED NO AUTH NEEDED PAYS AT 70% DED MET OOP 7500.00 4191.55 APPLIED   Referring Provider: Roberto Hardin MD     Assessment:  Princess Thompson has completed 6 sessions of physical therapy treatment with emphasis upon B knee and hip pain. She demonstrates improving symptoms with the same active and resting pain . Her R hip strength and comfort with MMT has improved compared to initial evaluation. However, pain is still present with prolonged standing at work and walking. Currently, Princess Thompson is making progress towards all established PT POC goals at this time. she would continue to benefit from skilled PT services to address her remaining impairments to restore her PLOF/activity tolerance. Recommend she continue with PT treatment 1x per week for 4-6 visits. Princess Thompson voiced agreement and satisfaction with this plan.      Plan:  Exercises targeting surrounding musculature to R knee and hip and improve function. Continued education on techniques such as ice, compression and elevation to manage pain and swelling. Reduce risk of falling. Improve balance. Modalities as needed to address symptoms. Cont R hip mobilizations for pain relief if pt felt beneficial.    Therapy diagnoses/Current problem  Problem List Items Addressed This Visit             ICD-10-CM       Musculoskeletal and Injuries    Right hip pain - Primary M25.551    Relevant Orders    Follow Up In Physical Therapy     Other Visit Diagnoses         Codes    Primary osteoarthritis of right knee     M17.11    Arthritis of left knee     M17.12             Subjective:  Pt states that she worked today and was on her feet for 4 hours; still wearing heavy duty brace. Denies L knee buckling or falls since previous visit. Significant pain after last session with manual tx to R hip; felt fine for x1 hour after session and then had severe pain in hip. Pt strongly considering R [facility] but is not a candidate for surgery due to recent R hip injection.  Pain Location R lateral knee and R hip  Pain (0-10): 4   HEP adherence / understanding: compliance with the instructed home exercises. 5/7 days a week    Precautions:  Fall Risk: None   + HBP; medically managed. + HA/migraines; medically manage. +OA; medically managed. +Thyroid disorder; medically managed  Denies: CA, pacemaker, DM, blood thinner medication use, latex allergy, epilepsy/seizures, or other known cardio/neuro/pulmonary problems   Denies unexpected weight loss/gain, night sweats, or night pain.     Previous surgeries include:  None orthopedically related    Objective   Outcome Measures:  Other Measures  Oswestry Disablity Index (HANG): 42% --> not administered     Observation: Increased familiar sx's with R hip flexion in sitting and supine --> pain with hip ER, IR and abduction  Gait: WFL --> antalgic with compensated R Trendelenburg and decreased matt  Posture: fair, forward head, rounded shoulders --> same  Correction of posture: ne on sx's --> same     Dermatomes/Sensation Testing:  (L2) Anterior Thigh:  intact --> same  (L3) Medial Knee:  intact --> same   (L4) Medial Malleolus:  intact --> same  (L5) Dorsal Second Toe Web Space: intact --> same  (S1) Lateral Malleolus:  intact --> same      Myotomes/Strength Testing (MMT in sitting):  (L2) Hip Flex (R/L):  4/5 p!, 4+ /5 --> 5/5 , 5/5   (L3) Knee Ext (R/L): 5/5, 5/5 --> same  Knee Flex (R/L): 5/5, 5/5 --> 4+/5 , 4+/5   (L4) Ankle DF (R/L): 4+/5, 4+/5 --> same  (L5) GTE (R/L): 4+/5, 4+/5 --> same  (S1) Ankle PF (R/L):  5/5, 5/5 --> same     Hip ER (R/L): 4/5  ", 5/5   Hip IR (R/L): 5/5 p! , 5/5     Hip Abd. (R/L): 4+/5 , 4+/5 --> 4/5 p! , 5/5   Hip Ext. (R/L): 4-/5p! , 4/5 p! (On R) --> 4/5 , 4-/5      Heel walking (L4): diminished R. Intact L --> NT  Toe walking (S1):  WFL B --> NT    Hip ROM:  Flexion (R/L): 100 / 105   Extension (R/L): ~ 10 / 20  IR (R/L): 25 p! / 15   ER (R/L): 30 p! / 30  Abduction (R/L): 95 p! / NT     Special Tests:  Slump R/L:  neg / neg --> NT   Active SLR R/L: pos/ neg --> NT      Lumbar ROM/Pretest Symptoms Standing:  FIS: no limitation with reach to floor; ne on sx's --> NT  RFIS: 10x; sx's worse --> NT   EIS: mod limitation; ne on sx's --> NT  LAMONT: 20x; sx's better --> NT      Trunk SB R/L: mod limitation; sx's worse/ min limitation; sx's worse --> NT  Trunk Rotation R/L: no limitation; ne on sx's / no limitation; ne on sx's --> NT     Palpation: TTP to L4-L5, R PSIS, R superior glute, R greater trochanter, R lateral knee --> R lateral knee joint, R greater trochanter        KEY  NT = not tested this visit  p! = pain  ~ = approximation    Treatment Performed: (\"NP\" = Not Performed)     Therapeutic Activity:   - Completed RA this session. Please see objective and goals sections for more details.    Therapeutic Exercise:  minutes  Access Code: [email or web address]  - Nu-step lvl 4 x10 mins; LE warm-up, subjective, and goal review     NP:  - slant board stretch 2 x 30 sec hold  - seated hamstring stretch 2 x 30 sec hold   - Standing Lumbar Extension over mat table 10x   - 4\" forward step-ups 2 x 10 ea   - SLR 2 x 10   - s/l hip abduction 2 x 10, 0#  - 4\" lateal step-ups 10x ea  - 2\" lateral step dip 2 x 10  - Clamshells 2 x 10 2# above knee   - seated abdominal bracing w/ chest press 2 x 10, 5# KB   - 4\" step downs 10x ea   - EOB modified hip extension: 2 x 10  - sls hip abduction 2 x 10  - Prone lying x2 mins; sx's better  - Prone on elbows x2 mins; sx's better  - PPU on elbows 2x10; sx's better. Pt reports pain in hip and lateral knee with transitioning " "from prone to supine   - Supine Transversus Abdominis Bracing 10x5\" holds  - Supine TA with alternating marches 10x; pt reports increased pain  - Supine TA alternating knee extension 10x  - Supine Bridge  - 1 x daily - 7 x weekly - 3 sets - 10 reps    Manual Therapy:   Pt supine:  - R hip short-axis distraction mobs grade 3-4  - R hip lateral mobs with belt grade 3-4    Side-lying: NP  Stm of R lateral quad, IT band, glute medius  Tpr of lateral quad  Seated off edge of bed w/ knee slightly bent:  Kinesiology tape R knee: medial and lateral joint line support (x-pattern), tension strip across patellar tendon  - STM/DTM to L3-L5 lumbar paraspinals and QLs  - Grade 2-3 PA mobs to L1-5 spinous processes     Modalities: NP  US x 10 minutes, 3.3hz @ 50%, int= 1.2, lateral joint line R knee, brown wedge under LE's  Skin intact following      KEY  NC = not completed this visit   ! = pain  ~ = approximation  // = parallel  RA = re-assessment  NV = next visit    Education:   - objective exam findings  - POC update with cont 1x/week for 4-6 visits   - hip joint and glute mm anatomy with attachments to hip      Goals:  ST weeks  1. Patient independent with home exercise program to progress current functional status  - 24: partially met; 5/7 days a week     LTGs: 6 weeks  1.  Improve Oswestry score to 0-19% impairment to indicate a significant improvement in overall lumbar perception of disability.  - 24: not administered    2.  Decrease complaints of pain by 80% at minimum of evaluation rating, 4 of 7 days a week at minimum.  - 24: partially met    3. Patient will demonstrate improvements in sitting and standing posture without cueing from therapist during 45 minute session to allow for improved tolerances for spinal loading.  - 24: partially met    4.  Patient will demonstrate appropriate and safe body mechanics with work related and/or clinical activities to manage pain and prevent further " injury  - 11/21/24: discontinued    5.  Increase LE strength by 0.5 to 1 manual testing grade to improve overall functional mobility for IADLS and postural control.    - 11/21/24: partially met    6.  Increase spine AROM to </= mild limitations as appropriate in each plane, to improve functional mobility tolerances for IADLS.  - 11/21/24: discontinued    NEW GOALS ADDED 11/21/24:  7. Patient will improve R hip abduction ROM by >/=0- 10 deg for improved hip mobility during couch and bed mobility    8. Patient will improve R hip extension to >/= 0-20 deg for improved hip mobility and gait mechanics.    9. Patient will be able to standing for >/= 4 hours with </= 2/10 R hip and knee pain to improve comfort with work-related duties          Time Calculation  Start Time: 1449  Stop Time: 1530  Time Calculation (min): 41 min     PT Therapeutic Procedures Time Entry  Manual Therapy Time Entry: 5  Therapeutic Exercise Time Entry: 10  Therapeutic Activity Time Entry: 26

## 2024-11-26 ENCOUNTER — TREATMENT (OUTPATIENT)
Dept: PHYSICAL THERAPY | Facility: CLINIC | Age: 64
End: 2024-11-26
Payer: COMMERCIAL

## 2024-11-26 DIAGNOSIS — M17.12 ARTHRITIS OF LEFT KNEE: ICD-10-CM

## 2024-11-26 DIAGNOSIS — M17.11 PRIMARY OSTEOARTHRITIS OF RIGHT KNEE: ICD-10-CM

## 2024-11-26 PROCEDURE — 97110 THERAPEUTIC EXERCISES: CPT | Mod: GP | Performed by: INTERNAL MEDICINE

## 2024-11-26 NOTE — PROGRESS NOTES
Physical Therapy    Physical Therapy Treatment Note    Patient Name: Princess Thompson  MRN: [id number]  Today's Date: 11/26/2024  Time Calculation  Start Time: 1440  Stop Time: 1518  Time Calculation (min): 38 min    Insurance - reviewed   Visit number: 7 (updated 11/26/24)   Payor: JUDY / Plan: JUDY [place] / Product Type: *No Product type* /    EVAL $78.30 //90 PT/OT/ST V PCY 0 USED NO AUTH NEEDED PAYS AT 70% DED MET OOP 7500.00 4191.55 APPLIED   Referring Provider: Roberto Hardin MD     Assessment:  Progress towards functional goals: Patient reports there has not been a significant change in functional abilities.   Response to interventions: Focus of session on stretching and strengthening of mm surrounding R hip joint. Pt tolerates majority of there-ex without increase in pain. Cont to require cues for clamshells to isolate glute med; added pillow in between knees for comfort due to pain with increased adduction/IR. Updated HEP and administered hand-out. Pt denies increase in pain at end of session.   Justification for continued skilled care: To address remaining functional, objective and subjective deficits to allow the patient to return to full independence with ADLs. Progressive strengthening to stabilize the R knee and hip joint to improve function. Assess effectiveness of HEP. Modify and progress home exercise program. Reduce pain to improve function.         Plan:  Exercises targeting surrounding musculature to R knee and hip and improve function. Continued education on techniques such as ice, compression and elevation to manage pain and swelling. Reduce risk of falling. Improve balance. Modalities as needed to address symptoms. Progress loaded exercise as carmen.    Therapy diagnoses/Current problem  Problem List Items Addressed This Visit    None  Visit Diagnoses         Codes    Primary osteoarthritis of right knee     M17.11    Arthritis of left knee     M17.12          Subjective:  Pt states she was  "good after previous session but was sore in her R hip and knee the day after. Pt was standing/walking and doing stairs this past Sunday at a [employer] and has increased pain to a 5-6/10. Pt in to session with R knee brace. Pt states she did not work today. Pt felt like hip mobilizations did not provide pain relief or benefit.  Pain Location R lateral knee and R hip  Pain (0-10): 0  HEP adherence / understanding: compliance with the instructed home exercises. 5/7 days a week    Precautions:  Fall Risk: None   + HBP; medically managed. + HA/migraines; medically manage. +OA; medically managed. +Thyroid disorder; medically managed  Denies: CA, pacemaker, DM, blood thinner medication use, latex allergy, epilepsy/seizures, or other known cardio/neuro/pulmonary problems   Denies unexpected weight loss/gain, night sweats, or night pain.     Previous surgeries include:  None orthopedically related    Treatment Performed: (\"NP\" = Not Performed)     Therapeutic Activity:   - Completed RA this session. Please see objective and goals sections for more details.    Therapeutic Exercise:  minutes  Access Code: [email or web address]  - Nu-step lvl 4>>>6 x10 mins; LE warm-up, subjective  - slant board stretch 2 x 30 sec hold  - seated hamstring stretch 2 x 30 sec hold   - seated R piriformis stretch 2x30 sec hold   - EOB modified hip extension and donkey kicks 10x each side, each way  - s/l hip abduction 2 x 10, 0#  - s/l reverse clamshells 2x10  - clamshells 2 x 10 with pillow in-between knees  - supine R ITB stretch 2x30\" holds    NC:  - Standing Lumbar Extension over mat table 10x   - 4\" forward step-ups 2 x 10 ea   - SLR 2 x 10   - 4\" lateal step-ups 10x ea  - 2\" lateral step dip 2 x 10  - seated abdominal bracing w/ chest press 2 x 10, 5# KB   - 4\" step downs 10x ea   - sls hip abduction 2 x 10  - Prone lying x2 mins; sx's better  - Prone on elbows x2 mins; sx's better  - PPU on elbows 2x10; sx's better. Pt reports pain in hip and lateral " "knee with transitioning from prone to supine   - Supine Transversus Abdominis Bracing 10x5\" holds  - Supine TA with alternating marches 10x; pt reports increased pain  - Supine TA alternating knee extension 10x  - Supine Bridge  - 1 x daily - 7 x weekly - 3 sets - 10 reps    Manual Therapy: NC  Pt supine:  - R hip short-axis distraction mobs grade 3-4  - R hip lateral mobs with belt grade 3-4    Side-lying: NC  Stm of R lateral quad, IT band, glute medius  Tpr of lateral quad  Seated off edge of bed w/ knee slightly bent:  Kinesiology tape R knee: medial and lateral joint line support (x-pattern), tension strip across patellar tendon  - STM/DTM to L3-L5 lumbar paraspinals and QLs  - Grade 2-3 PA mobs to L1-5 spinous processes     Modalities: NC  US x 10 minutes, 3.3hz @ 50%, int= 1.2, lateral joint line R knee, brown wedge under LE's  Skin intact following      KEY  NC = not completed this visit   ! = pain  ~ = approximation  // = parallel  RA = re-assessment  NV = next visit      Education:   - cues/rationale for there-ex  - HEP update and hand-out  - discussed completing prone over edge of bed with pillows on elevated counter at home and using pillow in between knees during clamshells to decrease pain.            Time Calculation  Start Time: 1440  Stop Time: 1518  Time Calculation (min): 38 min     PT Therapeutic Procedures Time Entry  Therapeutic Exercise Time Entry: 38                    "

## 2024-12-04 PROBLEM — M17.12 ARTHRITIS OF LEFT KNEE: Status: ACTIVE | Noted: 2024-12-04

## 2024-12-04 PROBLEM — M17.11 PRIMARY OSTEOARTHRITIS OF RIGHT KNEE: Status: ACTIVE | Noted: 2024-12-04

## 2024-12-04 NOTE — PROGRESS NOTES
Physical Therapy    Physical Therapy Treatment Note    Patient Name: Princess Thompson  MRN: [id number]  Today's Date: 12/5/2024  Time Calculation  Start Time: 1430  Stop Time: 1518  Time Calculation (min): 48 min    Insurance - reviewed   Visit number: 8 (updated 12/05/24)   Payor: JUDY / Plan: JUDY [place] / Product Type: *No Product type* /    EVAL $78.30 //90 PT/OT/ST V PCY 0 USED NO AUTH NEEDED PAYS AT 70% DED MET OOP 7500.00 4191.55 APPLIED   Referring Provider: Roberto Hardin MD     Assessment:  Progress towards functional goals: Patient reports there has not been a significant change in functional abilities, however, reduced R lat hip pain w/ gait reported following tx today.  Response to interventions:  Chronic R lateral hip and knee pain severely limiting gait, prolonged stand, and fxnl mobility in home and work.  Re-trialed manual therapy to address MOD palpable pain and tightness R ITB, both proximally and distally.  Post tx, good reduction in pain and less R lateral lean during gait cycle evident, however, will monitor carryover from manual therapy in subsequent sessions.    Upon review, pt denies any elevated sxs w/ current HeP in NWB, will continue to monitor.  Justification for continued skilled care: To address remaining functional, objective and subjective deficits to allow the patient to return to full independence with ADLs. Progressive strengthening to stabilize the R knee and hip joint to improve function. Assess effectiveness of HEP. Modify and progress home exercise program. Reduce pain to improve function.         Plan:  Monitor carryover from manual therapy.  Monitor sxs to current HEP: modify HEP as needed.  Pt scheduled to have [facility] ortho consult in January.    Therapy diagnoses/Current problem  Problem List Items Addressed This Visit             ICD-10-CM    Right hip pain M25.551    Primary osteoarthritis of right knee - Primary M17.11    Arthritis of left knee M17.12  "    Subjective:  Pt reports overall, pain less on days she does not work, like today (standing for 4.5 hr shifts, several days per week).  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 Pt is to see new ORTHO at [facility] to discuss hip replacement in January.  Pain Location R lateral knee and R hip: ITB.  R groin pain not as frequent as lateral hip pain.  Pain (0-10): 3-7/10  HEP adherence / understanding: compliance with the instructed home exercises. 5/7 days a week, does feel very sore day of or day following HEP    Precautions:  Fall Risk: None   + HBP; medically managed. + HA/migraines; medically manage. +OA; medically managed. +Thyroid disorder; medically managed  Denies: CA, pacemaker, DM, blood thinner medication use, latex allergy, epilepsy/seizures, or other known cardio/neuro/pulmonary problems   Denies unexpected weight loss/gain, night sweats, or night pain.     Previous surgeries include:  None orthopedically related    Treatment Performed: (\"NP\" = Not Performed)     Therapeutic Exercise:  minutes:   Access Code: [email or web address]  - Nu-step lvl 4>>>6 x10 mins; LE warm-up, subjective: NV: can reduce time or discharge due to pt has home recumbent bike    review only, pt denies elevated sxs w/ any of these ex except supine itb stretching (hip ADDuction)  - slant board stretch 2 x 30 sec hold  - seated hamstring stretch 2 x 30 sec hold   - seated R piriformis stretch (fig four) 2x30 sec hold   - EOB modified hip extension and donkey kicks 10x : R LE only due to pain in FWB  - s/l hip abduction 2 x 10, 0#  -SLR  -bridge  - s/l reverse clamshells 2x10  - clamshells 2 x 10 with pillow in-between knees  - supine OR STANDING IF MORE COMFORTABLE (R LEG BEHIND L LEG, LEAN FORWARD) R ITB stretch 2x30\" holds    Manual Therapy:   Side-lying:   Stm and cupping w/ mild external glide  to R IT band: entire length, anterior portion. Ant and post portion " distally    KEY  NC = not completed this visit   ! = pain  ~ = approximation  // = parallel  RA = re-assessment  NV = next visit      Education:   HEP review, rationale for tx interventions, discussion if sxs more muscular vs joint, to monitor sxs and carryover of tx. Use of ice vs heat.  Pt understood info provided: yes            Time Calculation  Start Time: 1430  Stop Time: 1518  Time Calculation (min): 48 min     PT Therapeutic Procedures Time Entry  Manual Therapy Time Entry: 25  Therapeutic Exercise Time Entry: 23

## 2024-12-05 ENCOUNTER — TREATMENT (OUTPATIENT)
Dept: PHYSICAL THERAPY | Facility: CLINIC | Age: 64
End: 2024-12-05
Payer: COMMERCIAL

## 2024-12-05 DIAGNOSIS — M25.551 RIGHT HIP PAIN: ICD-10-CM

## 2024-12-05 DIAGNOSIS — M17.11 PRIMARY OSTEOARTHRITIS OF RIGHT KNEE: Primary | ICD-10-CM

## 2024-12-05 DIAGNOSIS — M17.12 ARTHRITIS OF LEFT KNEE: ICD-10-CM

## 2024-12-05 PROCEDURE — 97140 MANUAL THERAPY 1/> REGIONS: CPT | Mod: GP,CQ

## 2024-12-05 PROCEDURE — 97110 THERAPEUTIC EXERCISES: CPT | Mod: GP,CQ

## 2024-12-10 DIAGNOSIS — M17.11 ARTHRITIS OF RIGHT KNEE: ICD-10-CM

## 2024-12-10 RX ORDER — HYALURONATE SODIUM 16.8MG/2ML
16.8 SYRINGE (ML) INTRAARTICULAR
Qty: 6 ML | Refills: 0 | Status: SHIPPED | OUTPATIENT
Start: 2024-12-10 | End: 2024-12-25

## 2024-12-12 ENCOUNTER — TREATMENT (OUTPATIENT)
Dept: PHYSICAL THERAPY | Facility: CLINIC | Age: 64
End: 2024-12-12
Payer: COMMERCIAL

## 2024-12-12 DIAGNOSIS — M25.551 RIGHT HIP PAIN: Primary | ICD-10-CM

## 2024-12-12 PROCEDURE — 97110 THERAPEUTIC EXERCISES: CPT | Mod: GP | Performed by: INTERNAL MEDICINE

## 2024-12-12 PROCEDURE — 97140 MANUAL THERAPY 1/> REGIONS: CPT | Mod: GP | Performed by: INTERNAL MEDICINE

## 2024-12-12 NOTE — PROGRESS NOTES
Physical Therapy    Physical Therapy Treatment Note    Patient Name: Princess Thompson  MRN: [id number]  Today's Date: 12/12/2024  Time Calculation  Start Time: 1436  Stop Time: 1516  Time Calculation (min): 40 min    Insurance - reviewed   Visit number: 9 (updated 12/12/24)   Payor: JUDY / Plan: JUDY [place] / Product Type: *No Product type* /    EVAL $78.30 //90 PT/OT/ST V PCY 0 USED NO AUTH NEEDED PAYS AT 70% DED MET OOP 7500.00 4191.55 APPLIED   Referring Provider: Roberto Hardin MD     Assessment:  Progress towards functional goals: Patient reports there has not been a significant change in functional abilities, however, reduced R lat hip pain w/ gait reported during prolonged walking  Response to interventions:  Chronic R lateral hip and knee pain severely limiting gait, prolonged stand, and fxnl mobility in home and work.  Verbally updated SLR to short-range SLR through pain free ROM (might be due to increased R hip flexion). Also trial leg press today; pt reports no pain; reports she will try to recreate with total gym set up at home. R hip instability observed during session when pivoting on RLE noted as a buckling/hitching motion; no overt LOB.   Cont manual therapy to address MOD palpable pain and tightness R ITB, both proximally and distally as well as monitor/gauge changes in gait and posture post intervention. Antalgic gait pattern remains post intervention. Will monitor carryover from manual therapy in subsequent sessions. Pt does not vocalize increased pain at end of session.  Justification for continued skilled care: To address remaining functional, objective and subjective deficits to allow the patient to return to full independence with ADLs. Progressive strengthening to stabilize the R knee and hip joint to improve function. Assess effectiveness of HEP. Modify and progress home exercise program. Reduce pain to improve function.         Plan:  Monitor carryover from manual therapy.  Monitor sxs  "to current HEP: modify HEP as needed. Hold in the next two sessions.     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 Pt reports feeling some R knee instability; denies buckling or falls. Walked around shopping yesterday for 1.5 hours without significant pain. Massage felt good x2 days. Pt reports having R lateral knee pain with supine SLRs.  Pain Location R lateral knee and R hip: ITB. R groin pain not as frequent as lateral hip pain but started before last session on 12/09; tried ice and Voltaren that did not help.  Pain (0-10): 3/10  HEP adherence / understanding: compliance with the instructed home exercises. 5/7 days a week, does feel sore day of or day following HEP but this is tolerable    Precautions:  Fall Risk: None   + HBP; medically managed. + HA/migraines; medically manage. +OA; medically managed. +Thyroid disorder; medically managed  Denies: CA, pacemaker, DM, blood thinner medication use, latex allergy, epilepsy/seizures, or other known cardio/neuro/pulmonary problems   Denies unexpected weight loss/gain, night sweats, or night pain.     Previous surgeries include:  None orthopedically related    Treatment Performed: (\"NP\" = Not Performed)     Therapeutic Exercise:  minutes:   Access Code: [email or web address]  - Nu-step lvl 4 x10 mins; LE warm-up, subjective  - Supine short range ROM R SLRs 2x10  - Leg press (seat 3) 50# 2x10   - Reviewed supine and standing ITB stretch    review only, pt denies elevated sxs w/ any of these ex except supine itb stretching (hip ADDuction)  - slant board stretch 2 x 30 sec hold  - seated hamstring stretch 2 x 30 sec hold   - seated R piriformis stretch (fig four) 2x30 sec hold   - EOB modified hip extension and donkey kicks 10x : R LE only due to pain in " "FWB  - s/l hip abduction 2 x 10, 0#  -SLR  -bridge  - s/l reverse clamshells 2x10  - clamshells 2 x 10 with pillow in-between knees  - supine OR STANDING IF MORE COMFORTABLE (R LEG BEHIND L LEG, LEAN FORWARD) R ITB stretch 2x30\" holds    Manual Therapy:   Supine:  Stm and cupping w/ mild external glide  to R IT band: entire length, anterior portion. Ant and post portion distally    KEY  NC = not completed this visit   ! = pain  ~ = approximation  // = parallel  RA = re-assessment  NV = next visit      Education:   rationale for tx interventions  POC update with hold in next two visits    Pt understood info provided: yes            Time Calculation  Start Time: 1436  Stop Time: 1516  Time Calculation (min): 40 min     PT Therapeutic Procedures Time Entry  Manual Therapy Time Entry: 11  Therapeutic Exercise Time Entry: 29                    "

## 2024-12-16 ENCOUNTER — TREATMENT (OUTPATIENT)
Dept: PHYSICAL THERAPY | Facility: CLINIC | Age: 64
End: 2024-12-16
Payer: COMMERCIAL

## 2024-12-16 DIAGNOSIS — M25.551 RIGHT HIP PAIN: Primary | ICD-10-CM

## 2024-12-16 PROCEDURE — 97140 MANUAL THERAPY 1/> REGIONS: CPT | Mod: GP,CQ

## 2024-12-16 PROCEDURE — 97110 THERAPEUTIC EXERCISES: CPT | Mod: GP,CQ

## 2024-12-16 NOTE — PROGRESS NOTES
Physical Therapy    Physical Therapy Treatment Note    Patient Name: Princess Thompson  MRN: [id number]  Today's Date: 12/16/2024  Time Calculation  Start Time: 0915  Stop Time: 0953  Time Calculation (min): 38 min    Insurance - reviewed   Visit number: 10 (updated 12/16/24)   Payor: JUDY / Plan: JUDY [place] [place] / Product Type: *No Product type* /    EVAL $78.30 //90 PT/OT/ST V PCY 0 USED NO AUTH NEEDED PAYS AT 70% DED MET OOP 7500.00 4191.55 APPLIED   Referring Provider: Roberto Hardin MD     Assessment:  Progress towards functional goals: Patient reports some reduction in pain and improved R LE strength since PT began, however, ongoing R knee pain still present w/ prolonged standing.   Response to interventions:  Ongoing R lateral knee/hip pain.  Pt demo MOD lateral lean to R in stance phase of gait: performed trial of MET to correct L depressed pubis with + response:  leg length equal in supine and less lateral lean demo in clinic following.  Issued pt self MET handout to perform on PRN basis.  Reviewed HEP in full, providing verbal/tactile cues for correct form and m. Activation.  R glut med weak in s/l position, which is also contributing to gait deficits.  Justification for continued skilled care: To address remaining functional, objective and subjective deficits to allow the patient to return to full independence with ADLs. Progressive strengthening to stabilize the R knee and hip joint to improve function. Assess effectiveness of HEP. Modify and progress home exercise program. Reduce pain to improve function.         Plan:  Pt on vacation.  Next appt 12/30.  Pt to discuss trial of gel injection R knee w MD vs TKA candidate.       Therapy diagnoses/Current problem  Problem List Items Addressed This Visit             ICD-10-CM    Right hip pain - Primary M25.551     Subjective:  Pt reports c/c of R lateral knee pain with prolonged standing, I.e. to bake cookies.   Massage felt good x2 days. Buckling of knee  "less frequent, but still occurs.    Pain Location R lateral knee and R hip: ITB.  Pain (0-10): 0/10.  Max 5-6/10.  HEP adherence / understanding: compliance with the instructed home exercises. 5/7 days a week, does feel sore day of or day following HEP but this is tolerable    Precautions:  Fall Risk: None   + HBP; medically managed. + HA/migraines; medically manage. +OA; medically managed. +Thyroid disorder; medically managed  Denies: CA, pacemaker, DM, blood thinner medication use, latex allergy, epilepsy/seizures, or other known cardio/neuro/pulmonary problems   Denies unexpected weight loss/gain, night sweats, or night pain.     Previous surgeries include:  None orthopedically related    Treatment Performed: (\"NP\" = Not Performed)     Therapeutic Exercise:  minutes:   Access Code: [email or web address]  - Nu-step lvl 4 x10 mins; LE warm-up, subjective  -issued self MET handout for pelvis: PRN    REVIEWED IN FULL, PROVIDED CUES FOR TECHNIQUE AND ADDING TA :  - Supine short range ROM R SLRs 2x10: with exhale for TA  - EOB modified hip extension and donkey kicks 10x : R LE only due to pain in FWB  - s/l hip abduction 2 x 10, 0#  -SLR  -bridge  - s/l reverse clamshells 2x10  - clamshells 2 x 10 with pillow in-between knees      Manual Therapy:   STM ITB: HELD  MET to correct L depressed pubis: all other pelvic/sacral landmarks level, leg length equal following    KEY  NC = not completed this visit   ! = pain  ~ = approximation  // = parallel  RA = re-assessment  NV = next visit      Education:   HEP review in full, update  Ice if sore post MET, rationale for MET    Pt understood info provided: yes            Time Calculation  Start Time: 0915  Stop Time: 0953  Time Calculation (min): 38 min     PT Therapeutic Procedures Time Entry  Manual Therapy Time Entry: 8  Therapeutic Exercise Time Entry: 30                    "

## 2024-12-26 NOTE — PROGRESS NOTES
Physical Therapy    Physical Therapy Treatment Note    Patient Name: Princess Thompson  MRN: [id number]  Today's Date: 12/30/2024  Time Calculation  Start Time: 1000  Stop Time: 1038  Time Calculation (min): 38 min    Insurance - reviewed   Visit number: 11 (updated 12/30/24)   Payor: JUDY / Plan: JUD[employer] [place] [place] / Product Type: *No Product type* /    EVAL $78.30 //90 PT/OT/ST V PCY 0 USED NO AUTH NEEDED PAYS AT 70% DED MET OOP 7500.00 4191.55 APPLIED   Referring Provider: Roberto Hardin MD     Assessment:  See updated goals below.  LBP abolished. MOD to HIGH amount of R hip and knee pain continues: pt to discuss potential surgical options w/ ortho 1/28/25.  Overall, pt notes improved R LE strength, as well as improved gait and ability to get off the floor since PT began.    Progress towards functional goals:   Updated goals:   1.  Improve Oswestry score to 0-19% impairment to indicate a significant improvement in overall lumbar perception of disability.  12/30:  0%: goal met  2.  Decrease complaints of pain by 80% at minimum of evaluation rating, 4 of 7 days a week at minimum.  12/30: LBP abolished: goal met. Still having MOD to HIGH amount of R hip/knee pain.  3. Patient will demonstrate improvements in sitting and standing posture without cueing from therapist during 45 minute session to allow for improved tolerances for spinal loading.  12/30: LBP does not hinder sitting nor standing :  only R hip/knee pain limits standing: goal partially met  4.  Patient will demonstrate appropriate and safe body mechanics with work related and/or clinical activities to manage pain and prevent further injury  12/30: no LBP to report at work, just R hip/knee pain: goal met    Plan:  Discharge w/ I HEP per pt request.  Evaluating PT in agreement w/ plan. Pt to see ortho 1/28/25 to discuss if surgical candidate to receive R JUDY.     Therapy diagnoses/Current problem  Problem List Items Addressed This Visit             ICD-10-CM     "Right hip pain - Primary M25.551     Subjective:  Pt feels ready to work on I HEP following today's session.  Is seeing 2nd ortho on 1/28/25 to discuss R JUDY and possible gel injection R knee.    Pain Location :  R groin, ITB, lat R knee  Pain: 5-10/10  HEP adherence / understanding: compliance with the instructed home exercises.     Precautions:  Fall Risk: None   + HBP; medically managed. + HA/migraines; medically manage. +OA; medically managed. +Thyroid disorder; medically managed  Denies: CA, pacemaker, DM, blood thinner medication use, latex allergy, epilepsy/seizures, or other known cardio/neuro/pulmonary problems   Denies unexpected weight loss/gain, night sweats, or night pain.     Previous surgeries include:  None orthopedically related  OBJECTIVE:   R knee 0-127 (L 0-132).  R hip AAROM : most pain reported w/ hip IR, discontinued reverse clams.  Able to bring knee to chest. ER \"fallout\" mod to severely limited by pain and tightness.     Treatment Performed: (\"NP\" = Not Performed)     Therapeutic Exercise:  minutes:   Access Code: [email or web address]  - Nu-step lvl 4 x10 mins; LE warm-up, subjective  -issued self MET handout for pelvis: PRN  Access Code: [email or web address]  - Supine Bridge  - 1 x daily - 7 x weekly - 3 sets - 10 reps  - Active Straight Leg Raise with Quad Set  - 1 x daily - 7 x weekly - 3 sets - 10 reps  - Full Leg Press  - 1 x daily - 7 x weekly - 3 sets - 10 reps  - Single Leg Press  - 1 x daily - 7 x weekly - 3 sets - 10 reps  - Heel Raises with Leg Press  - 1 x daily - 7 x weekly - 3 sets - 10 reps  - Hip Abduction Machine  - 1 x daily - 7 x weekly - 3 sets - 10 reps  - Seated Piriformis Stretch  - 1 x daily - 7 x weekly - 3-5 sets - 20-30 second hold  - Supine ITB Stretch  - 1 x daily - 7 x weekly - 3-5 sets - 20-30 second hold  - Standing ITB Stretch  - 1-3 x daily - 7 x weekly - 3 sets - 30 seconds hold  - Standing Hip Flexor Stretch  - 1 x daily - 7 x weekly - 2-3 sets - 30 seconds hold  - Supine Bridge " with G Resistance Band  (ADD WHEN ABLE)- 1 x daily - 3 x weekly - 2-3 sets - 10 reps  - Bridge with Hip Abduction and G Resistance - (ADD WHEN ABLE) - 1 x daily - 3 x weekly - 2-3 sets - 10 reps      KEY  NC = not completed this visit   ! = pain  ~ = approximation  // = parallel  RA = re-assessment  NV = next visit      Education:   HEP review in full, update/modified    Pt understood info provided: yes            Time Calculation  Start Time: 1000  Stop Time: 1038  Time Calculation (min): 38 min     PT Therapeutic Procedures Time Entry  Therapeutic Exercise Time Entry: 38

## 2024-12-30 ENCOUNTER — TREATMENT (OUTPATIENT)
Dept: PHYSICAL THERAPY | Facility: CLINIC | Age: 64
End: 2024-12-30
Payer: COMMERCIAL

## 2024-12-30 DIAGNOSIS — M25.551 RIGHT HIP PAIN: Primary | ICD-10-CM

## 2024-12-30 PROCEDURE — 97110 THERAPEUTIC EXERCISES: CPT | Mod: GP,CQ

## 2025-01-06 ENCOUNTER — APPOINTMENT (OUTPATIENT)
Dept: PHYSICAL THERAPY | Facility: CLINIC | Age: 65
End: 2025-01-06
Payer: COMMERCIAL